# Patient Record
Sex: MALE | Race: WHITE | NOT HISPANIC OR LATINO | Employment: FULL TIME | URBAN - METROPOLITAN AREA
[De-identification: names, ages, dates, MRNs, and addresses within clinical notes are randomized per-mention and may not be internally consistent; named-entity substitution may affect disease eponyms.]

---

## 2017-01-25 DIAGNOSIS — Z00.00 ROUTINE GENERAL MEDICAL EXAMINATION AT A HEALTH CARE FACILITY: Primary | ICD-10-CM

## 2017-02-01 ENCOUNTER — CLINICAL SUPPORT (OUTPATIENT)
Dept: AUDIOLOGY | Facility: CLINIC | Age: 37
End: 2017-02-01
Payer: COMMERCIAL

## 2017-02-01 ENCOUNTER — PROCEDURE VISIT (OUTPATIENT)
Dept: PULMONOLOGY | Facility: CLINIC | Age: 37
End: 2017-02-01
Payer: COMMERCIAL

## 2017-02-01 ENCOUNTER — OFFICE VISIT (OUTPATIENT)
Dept: INTERNAL MEDICINE | Facility: CLINIC | Age: 37
End: 2017-02-01
Payer: COMMERCIAL

## 2017-02-01 ENCOUNTER — HOSPITAL ENCOUNTER (OUTPATIENT)
Dept: RADIOLOGY | Facility: HOSPITAL | Age: 37
Discharge: HOME | End: 2017-02-01
Attending: INTERNAL MEDICINE
Payer: COMMERCIAL

## 2017-02-01 ENCOUNTER — CLINICAL SUPPORT (OUTPATIENT)
Dept: INTERNAL MEDICINE | Facility: CLINIC | Age: 37
End: 2017-02-01

## 2017-02-01 ENCOUNTER — CLINICAL SUPPORT (OUTPATIENT)
Dept: CARDIOLOGY | Facility: CLINIC | Age: 37
End: 2017-02-01
Payer: COMMERCIAL

## 2017-02-01 VITALS
BODY MASS INDEX: 27.09 KG/M2 | TEMPERATURE: 97 F | SYSTOLIC BLOOD PRESSURE: 110 MMHG | DIASTOLIC BLOOD PRESSURE: 62 MMHG | RESPIRATION RATE: 16 BRPM | OXYGEN SATURATION: 98 % | HEART RATE: 56 BPM | HEIGHT: 72 IN | WEIGHT: 200 LBS

## 2017-02-01 VITALS
RESPIRATION RATE: 16 BRPM | DIASTOLIC BLOOD PRESSURE: 62 MMHG | WEIGHT: 200.63 LBS | BODY MASS INDEX: 27.17 KG/M2 | HEART RATE: 56 BPM | HEIGHT: 72 IN | SYSTOLIC BLOOD PRESSURE: 110 MMHG

## 2017-02-01 DIAGNOSIS — Z00.00 ROUTINE GENERAL MEDICAL EXAMINATION AT A HEALTH CARE FACILITY: Primary | ICD-10-CM

## 2017-02-01 DIAGNOSIS — H90.5 HEARING LOSS, SENSORINEURAL, COMBINED TYPES: Primary | ICD-10-CM

## 2017-02-01 DIAGNOSIS — Z85.820 HISTORY OF MELANOMA: ICD-10-CM

## 2017-02-01 DIAGNOSIS — Z00.00 ROUTINE GENERAL MEDICAL EXAMINATION AT A HEALTH CARE FACILITY: ICD-10-CM

## 2017-02-01 LAB
ALBUMIN SERPL BCP-MCNC: 4.2 G/DL
ALP SERPL-CCNC: 37 U/L
ALT SERPL W/O P-5'-P-CCNC: 26 U/L
ANION GAP SERPL CALC-SCNC: 10 MMOL/L
AST SERPL-CCNC: 19 U/L
BILIRUB SERPL-MCNC: 0.7 MG/DL
BUN SERPL-MCNC: 22 MG/DL
CALCIUM SERPL-MCNC: 9.8 MG/DL
CHLORIDE SERPL-SCNC: 108 MMOL/L
CHOLEST/HDLC SERPL: 3.8 {RATIO}
CO2 SERPL-SCNC: 23 MMOL/L
CREAT SERPL-MCNC: 0.9 MG/DL
ERYTHROCYTE [DISTWIDTH] IN BLOOD BY AUTOMATED COUNT: 12.6 %
EST. GFR  (AFRICAN AMERICAN): >60 ML/MIN/1.73 M^2
EST. GFR  (NON AFRICAN AMERICAN): >60 ML/MIN/1.73 M^2
GLUCOSE SERPL-MCNC: 92 MG/DL
HCT VFR BLD AUTO: 41.2 %
HDL/CHOLESTEROL RATIO: 26.2 %
HDLC SERPL-MCNC: 141 MG/DL
HDLC SERPL-MCNC: 37 MG/DL
HGB BLD-MCNC: 15.1 G/DL
LDLC SERPL CALC-MCNC: 87.6 MG/DL
MCH RBC QN AUTO: 31.9 PG
MCHC RBC AUTO-ENTMCNC: 36.7 %
MCV RBC AUTO: 87 FL
NONHDLC SERPL-MCNC: 104 MG/DL
PLATELET # BLD AUTO: 252 K/UL
PMV BLD AUTO: 9.5 FL
POTASSIUM SERPL-SCNC: 4.5 MMOL/L
PRE FEF 25 75: 3.53 L/S (ref 3.47–5.16)
PRE FET 100: 9.32 S
PRE FEV1 FVC: 78 %
PRE FEV1: 3.8 L (ref 4.14–4.97)
PRE FIF 50: 3.27 L/S
PRE FVC: 4.85 L (ref 5.21–6.2)
PRE PEF: 7.62 L/S (ref 9.46–11.92)
PREDICTED FEV1 FVC: 80.42 % (ref 75.58–85.26)
PREDICTED FEV1: 4.56 L (ref 4.14–4.97)
PREDICTED FVC: 5.71 L (ref 5.21–6.2)
PROT SERPL-MCNC: 7.5 G/DL
PROVOCATION PROTOCOL: ABNORMAL
RBC # BLD AUTO: 4.74 M/UL
SODIUM SERPL-SCNC: 141 MMOL/L
TRIGL SERPL-MCNC: 82 MG/DL
WBC # BLD AUTO: 5.89 K/UL

## 2017-02-01 PROCEDURE — 90471 IMMUNIZATION ADMIN: CPT | Mod: S$GLB,,, | Performed by: INTERNAL MEDICINE

## 2017-02-01 PROCEDURE — 90688 IIV4 VACCINE SPLT 0.5 ML IM: CPT | Mod: S$GLB,,, | Performed by: INTERNAL MEDICINE

## 2017-02-01 PROCEDURE — 80061 LIPID PANEL: CPT | Mod: PO

## 2017-02-01 PROCEDURE — 86703 HIV-1/HIV-2 1 RESULT ANTBDY: CPT

## 2017-02-01 PROCEDURE — 80053 COMPREHEN METABOLIC PANEL: CPT | Mod: PO

## 2017-02-01 PROCEDURE — 83655 ASSAY OF LEAD: CPT

## 2017-02-01 PROCEDURE — 99385 PREV VISIT NEW AGE 18-39: CPT | Mod: 25,S$GLB,, | Performed by: INTERNAL MEDICINE

## 2017-02-01 PROCEDURE — 83036 HEMOGLOBIN GLYCOSYLATED A1C: CPT

## 2017-02-01 PROCEDURE — 92552 PURE TONE AUDIOMETRY AIR: CPT | Mod: S$GLB,,, | Performed by: AUDIOLOGIST

## 2017-02-01 PROCEDURE — 85027 COMPLETE CBC AUTOMATED: CPT | Mod: PO

## 2017-02-01 PROCEDURE — 71020 XR CHEST PA AND LATERAL: CPT | Mod: TC,PO

## 2017-02-01 PROCEDURE — 71020 XR CHEST PA AND LATERAL: CPT | Mod: 26,,, | Performed by: RADIOLOGY

## 2017-02-01 PROCEDURE — 94010 BREATHING CAPACITY TEST: CPT | Mod: S$GLB,,, | Performed by: INTERNAL MEDICINE

## 2017-02-01 PROCEDURE — 99999 PR PBB SHADOW E&M-EST. PATIENT-LVL III: CPT | Mod: PBBFAC,,, | Performed by: INTERNAL MEDICINE

## 2017-02-01 PROCEDURE — 93000 ELECTROCARDIOGRAM COMPLETE: CPT | Mod: S$GLB,,, | Performed by: INTERNAL MEDICINE

## 2017-02-01 NOTE — PROGRESS NOTES
Subjective:      Patient ID: Rip Lopez is a 36 y.o. male.    Chief Complaint: Executive Health    HPI Comments: 35 yo with Past Medical History:    History of melanoma                             1991           for [facility] dept.  with 3 healthy children.    No meds  nkda    Past Surgical History:    SKIN CANCER EXCISION                             1991          APPENDECTOMY                                     2000    family history includes Diabetes in his father; Heart disease in his father s/p bypass in his early 50s. ; Hypertension in his father; epilepsy in his mother but o/w. There is no history of Cancer.    Prevent: reported flu vaccine up to date. tdap given on day of exam.                         Review of Systems   Constitutional: Negative for chills and fever.   HENT: Negative for ear pain and sore throat.    Respiratory: Negative for cough.    Cardiovascular: Negative for chest pain.   Gastrointestinal: Negative for abdominal pain and blood in stool.   Genitourinary: Negative for dysuria and hematuria.   Neurological: Negative for seizures and syncope.     Objective:     Visit Vitals    /62    Pulse (!) 56    Temp 96.7 °F (35.9 °C) (Tympanic)    Resp 16    Ht 6' (1.829 m)    Wt 90.7 kg (200 lb)    SpO2 98%    BMI 27.12 kg/m2       Physical Exam   Constitutional: He is oriented to person, place, and time. He appears well-developed and well-nourished. No distress.   HENT:   Head: Normocephalic and atraumatic.   Mouth/Throat: Oropharynx is clear and moist.   Eyes: EOM are normal. Pupils are equal, round, and reactive to light.   Neck: Neck supple. No thyromegaly present.   Cardiovascular: Normal rate and regular rhythm.    Pulmonary/Chest: Breath sounds normal. He has no wheezes. He has no rales.   Abdominal: Soft. Bowel sounds are normal. There is no tenderness.   Musculoskeletal: He exhibits no edema.   Lymphadenopathy:     He has no cervical adenopathy.    Neurological: He is alert and oriented to person, place, and time.   Skin: Skin is warm and dry.   Psychiatric: He has a normal mood and affect. His behavior is normal.       Assessment:     1. Routine general medical examination at a health care facility    2. History of melanoma      Plan:   Routine general medical examination at a health care facility  -     Influenza - Quadrivalent (3 years & older)    History of melanoma  -     Ambulatory referral to Dermatology        See [facility] health letter for details.      Return if symptoms worsen or fail to improve.

## 2017-02-01 NOTE — PROGRESS NOTES
Executive Health Screening    Rip Lopez was seen 02/01/2017 for an executive health hearing screen.      Results reveal a mild-to-moderately severe hearing loss 9309-4821 Hz for the right ear, and  mild-to-moderately severe hearing loss 8313-0062 Hz for the left ear.     Otoscopy was unremarkable.    Patient was counseled on the above findings.    Recommendations include:    1.  Complete Audiological Evaluation  2.  ENT followup  3.  Possible Hearing aid consult pending candidacy   4.  Wear hearing protective devices around loud noise  5.  Annual audiograms

## 2017-02-02 LAB
CITY: NORMAL
COUNTY: NORMAL
ESTIMATED AVG GLUCOSE: 85 MG/DL
GUARDIAN FIRST NAME: NORMAL
GUARDIAN LAST NAME: NORMAL
HBA1C MFR BLD HPLC: 4.6 %
HIV 1+2 AB+HIV1 P24 AG SERPL QL IA: NEGATIVE
LEAD BLD-MCNC: <1 MCG/DL (ref 0–4.9)
PHONE #: NORMAL
POSTAL CODE: NORMAL
RACE: NORMAL
SPECIMEN SOURCE: NORMAL
STATE OF RESIDENCE: NORMAL
STREET ADDRESS: NORMAL

## 2018-11-08 DIAGNOSIS — Z00.00 ROUTINE GENERAL MEDICAL EXAMINATION AT A HEALTH CARE FACILITY: Primary | ICD-10-CM

## 2018-11-27 ENCOUNTER — CLINICAL SUPPORT (OUTPATIENT)
Dept: PULMONOLOGY | Facility: CLINIC | Age: 38
End: 2018-11-27

## 2018-11-27 ENCOUNTER — CLINICAL SUPPORT (OUTPATIENT)
Dept: AUDIOLOGY | Facility: CLINIC | Age: 38
End: 2018-11-27

## 2018-11-27 ENCOUNTER — OFFICE VISIT (OUTPATIENT)
Dept: INTERNAL MEDICINE | Facility: CLINIC | Age: 38
End: 2018-11-27
Payer: COMMERCIAL

## 2018-11-27 ENCOUNTER — CLINICAL SUPPORT (OUTPATIENT)
Dept: INTERNAL MEDICINE | Facility: CLINIC | Age: 38
End: 2018-11-27
Payer: COMMERCIAL

## 2018-11-27 VITALS
HEART RATE: 58 BPM | HEIGHT: 72 IN | SYSTOLIC BLOOD PRESSURE: 128 MMHG | TEMPERATURE: 98 F | BODY MASS INDEX: 27.77 KG/M2 | DIASTOLIC BLOOD PRESSURE: 78 MMHG | WEIGHT: 205 LBS | OXYGEN SATURATION: 98 % | RESPIRATION RATE: 14 BRPM

## 2018-11-27 VITALS
WEIGHT: 205 LBS | HEART RATE: 58 BPM | RESPIRATION RATE: 14 BRPM | SYSTOLIC BLOOD PRESSURE: 128 MMHG | HEIGHT: 72 IN | BODY MASS INDEX: 27.77 KG/M2 | DIASTOLIC BLOOD PRESSURE: 78 MMHG

## 2018-11-27 DIAGNOSIS — Z85.820 HISTORY OF MELANOMA: ICD-10-CM

## 2018-11-27 DIAGNOSIS — R74.8 ELEVATED LIVER ENZYMES: ICD-10-CM

## 2018-11-27 DIAGNOSIS — Z23 NEED FOR INFLUENZA VACCINATION: ICD-10-CM

## 2018-11-27 DIAGNOSIS — Z01.12 ENCOUNTER FOR HEARING CONSERVATION AND TREATMENT: Primary | ICD-10-CM

## 2018-11-27 DIAGNOSIS — Z00.00 ROUTINE GENERAL MEDICAL EXAMINATION AT A HEALTH CARE FACILITY: Primary | ICD-10-CM

## 2018-11-27 DIAGNOSIS — Z00.00 ROUTINE GENERAL MEDICAL EXAMINATION AT A HEALTH CARE FACILITY: ICD-10-CM

## 2018-11-27 DIAGNOSIS — H91.90 HEARING DIFFICULTY, UNSPECIFIED LATERALITY: ICD-10-CM

## 2018-11-27 LAB
ALBUMIN SERPL BCP-MCNC: 4.2 G/DL
ALP SERPL-CCNC: 38 U/L
ALT SERPL W/O P-5'-P-CCNC: 46 U/L
ANION GAP SERPL CALC-SCNC: 7 MMOL/L
AST SERPL-CCNC: 57 U/L
BILIRUB SERPL-MCNC: 0.9 MG/DL
BILIRUB UR QL STRIP: NEGATIVE
BRPFT: NORMAL
BUN SERPL-MCNC: 21 MG/DL
CALCIUM SERPL-MCNC: 9.7 MG/DL
CHLORIDE SERPL-SCNC: 107 MMOL/L
CHOLEST SERPL-MCNC: 151 MG/DL
CHOLEST/HDLC SERPL: 3.7 {RATIO}
CLARITY UR: CLEAR
CO2 SERPL-SCNC: 27 MMOL/L
COLOR UR: YELLOW
CREAT SERPL-MCNC: 0.9 MG/DL
ERYTHROCYTE [DISTWIDTH] IN BLOOD BY AUTOMATED COUNT: 12.8 %
EST. GFR  (AFRICAN AMERICAN): >60 ML/MIN/1.73 M^2
EST. GFR  (NON AFRICAN AMERICAN): >60 ML/MIN/1.73 M^2
ESTIMATED AVG GLUCOSE: 85 MG/DL
FEF 25 75 LLN: 2.56
FEF 25 75 PRE REF: 79 %
FEF 25 75 REF: 4.36
FEV1 FVC LLN: 70
FEV1 FVC PRE REF: 98.4 %
FEV1 FVC REF: 81
FEV1 LLN: 3.59
FEV1 PRE REF: 83.6 %
FEV1 REF: 4.53
FEV6 LLN: 4.57
FEV6 PRE REF: 84.7 %
FEV6 PRE: 4.69 L (ref 4.57–6.49)
FEV6 REF: 5.53
FVC LLN: 4.5
FVC PRE REF: 84.6 %
FVC REF: 5.65
GLUCOSE SERPL-MCNC: 92 MG/DL
GLUCOSE UR QL STRIP: NEGATIVE
HBA1C MFR BLD HPLC: 4.6 %
HCT VFR BLD AUTO: 41.2 %
HDLC SERPL-MCNC: 41 MG/DL
HDLC SERPL: 27.2 %
HGB BLD-MCNC: 14.9 G/DL
HGB UR QL STRIP: NEGATIVE
KETONES UR QL STRIP: NEGATIVE
LDLC SERPL CALC-MCNC: 94.6 MG/DL
LEUKOCYTE ESTERASE UR QL STRIP: NEGATIVE
MCH RBC QN AUTO: 31.6 PG
MCHC RBC AUTO-ENTMCNC: 36.2 G/DL
MCV RBC AUTO: 88 FL
MVV LLN: 144
MVV REF: 170
NITRITE UR QL STRIP: NEGATIVE
NONHDLC SERPL-MCNC: 110 MG/DL
PEF LLN: 8.21
PEF PRE REF: 95.4 %
PEF REF: 10.66
PH UR STRIP: 6 [PH] (ref 5–8)
PLATELET # BLD AUTO: 235 K/UL
PMV BLD AUTO: 9.4 FL
POTASSIUM SERPL-SCNC: 4.3 MMOL/L
PRE FEF 25 75: 3.45 L/S (ref 2.56–6.16)
PRE FET 100: 9.45 SEC
PRE FEV1 FVC: 79.24 % (ref 70.15–90.99)
PRE FEV1: 3.79 L (ref 3.59–5.47)
PRE FVC: 4.78 L (ref 4.5–6.8)
PRE PEF: 10.17 L/S (ref 8.21–13.11)
PROT SERPL-MCNC: 7.5 G/DL
PROT UR QL STRIP: NEGATIVE
RBC # BLD AUTO: 4.71 M/UL
SODIUM SERPL-SCNC: 141 MMOL/L
SP GR UR STRIP: 1.02 (ref 1–1.03)
TRIGL SERPL-MCNC: 77 MG/DL
URN SPEC COLLECT METH UR: NORMAL
WBC # BLD AUTO: 5.02 K/UL

## 2018-11-27 PROCEDURE — 90472 IMMUNIZATION ADMIN EACH ADD: CPT | Mod: S$GLB,,, | Performed by: INTERNAL MEDICINE

## 2018-11-27 PROCEDURE — 99395 PREV VISIT EST AGE 18-39: CPT | Mod: 25,S$GLB,, | Performed by: INTERNAL MEDICINE

## 2018-11-27 PROCEDURE — 81003 URINALYSIS AUTO W/O SCOPE: CPT | Mod: PO

## 2018-11-27 PROCEDURE — 90686 IIV4 VACC NO PRSV 0.5 ML IM: CPT | Mod: S$GLB,,, | Performed by: INTERNAL MEDICINE

## 2018-11-27 PROCEDURE — 94010 BREATHING CAPACITY TEST: CPT | Mod: S$GLB,,, | Performed by: INTERNAL MEDICINE

## 2018-11-27 PROCEDURE — 90715 TDAP VACCINE 7 YRS/> IM: CPT | Mod: S$GLB,,, | Performed by: INTERNAL MEDICINE

## 2018-11-27 PROCEDURE — 80053 COMPREHEN METABOLIC PANEL: CPT | Mod: PO

## 2018-11-27 PROCEDURE — 83036 HEMOGLOBIN GLYCOSYLATED A1C: CPT

## 2018-11-27 PROCEDURE — 92552 PURE TONE AUDIOMETRY AIR: CPT | Mod: S$GLB,,, | Performed by: AUDIOLOGIST-HEARING AID FITTER

## 2018-11-27 PROCEDURE — 90471 IMMUNIZATION ADMIN: CPT | Mod: S$GLB,,, | Performed by: INTERNAL MEDICINE

## 2018-11-27 PROCEDURE — 83655 ASSAY OF LEAD: CPT

## 2018-11-27 PROCEDURE — 80061 LIPID PANEL: CPT | Mod: PO

## 2018-11-27 PROCEDURE — 99999 PR PBB SHADOW E&M-EST. PATIENT-LVL IV: CPT | Mod: PBBFAC,,, | Performed by: INTERNAL MEDICINE

## 2018-11-27 PROCEDURE — 85027 COMPLETE CBC AUTOMATED: CPT | Mod: PO

## 2018-11-27 NOTE — PROGRESS NOTES
Subjective:      Patient ID: Rip Lopez is a 38 y.o. male.    Chief Complaint: Executive Health    HPI   It was a pleasure to meet you for your executive  physical on 11/27/18.  You are a 38-year-old  gentleman with a past medical history of melanoma  diagnosed in 1991.  You are a  for [employer].  You are  with three  healthy children.  You currently take no medications  and you have no known drug allergies. Non smoker. Occ etoh.      Pcp: [facility].   Your past surgical history includes skin cancer  excision in 1991 and an appendectomy in year 2000.     Your family history includes diabetes and heart disease  in your father.  Your father had bypass surgery in his  early 50s.  Your father also has a history of  hypertension.  Your mother has a history of epilepsy,  but is otherwise healthy. Siblings healthy.     PREVENTIVE MEDICINE: flu vaccine today.  Your tetanus, diphtheria, and  whooping cough vaccine was given on day of your exec health exam.     Review of Systems   Constitutional: Negative for chills and fever.   HENT: Negative for ear pain and sore throat.    Respiratory: Negative for cough.    Cardiovascular: Negative for chest pain.   Gastrointestinal: Negative for abdominal pain and blood in stool.   Genitourinary: Negative for dysuria and hematuria.   Neurological: Negative for seizures and syncope.     Objective:   /78 (BP Location: Right arm, Patient Position: Sitting)   Pulse (!) 58   Temp 98.1 °F (36.7 °C) (Tympanic)   Resp 14   Ht 6' (1.829 m)   Wt 93 kg (205 lb 0.4 oz)   SpO2 98%   BMI 27.81 kg/m²     Physical Exam   Constitutional: He is oriented to person, place, and time. He appears well-developed and well-nourished. No distress.   HENT:   Head: Normocephalic and atraumatic.   Mouth/Throat: Oropharynx is clear and moist.   Eyes: EOM are normal. Pupils are equal, round, and reactive to light.   Neck: Neck supple. Carotid bruit is not present. No  thyromegaly present.   Cardiovascular: Normal rate and regular rhythm.   Pulmonary/Chest: Breath sounds normal. He has no wheezes. He has no rales.   Abdominal: Soft. Bowel sounds are normal. There is no tenderness.   Musculoskeletal: He exhibits no edema.   Lymphadenopathy:     He has no cervical adenopathy.   Neurological: He is alert and oriented to person, place, and time.   Skin: Skin is warm and dry.   Psychiatric: He has a normal mood and affect. His behavior is normal.       Assessment:     1. Routine general medical examination at a health care facility    2. History of melanoma    3. Hearing difficulty, unspecified laterality    4. Need for influenza vaccination    5. Elevated liver enzymes      Plan:   Routine general medical examination at a health care facility    History of melanoma  -     Ambulatory referral to Dermatology    Hearing difficulty, unspecified laterality  -     Ambulatory referral to ENT    Need for influenza vaccination  -     Influenza - Quadrivalent (3 years & older) (PF)  -     (In Office Administered) Tdap Vaccine    Elevated liver enzymes      Heart healthy diet and reg exercise  HM reviewed  Discuss elevated liver enzymes with pcp      Problem List Items Addressed This Visit        Oncology    History of melanoma    Relevant Orders    Ambulatory referral to Dermatology      Other Visit Diagnoses     Routine general medical examination at a health care facility    -  Primary    Hearing difficulty, unspecified laterality        Relevant Orders    Ambulatory referral to ENT    Need for influenza vaccination        Relevant Orders    Influenza - Quadrivalent (3 years & older) (PF) (Completed)    (In Office Administered) Tdap Vaccine (Completed)    Elevated liver enzymes              Follow-up if symptoms worsen or fail to improve.

## 2018-11-27 NOTE — PROGRESS NOTES
Executive Health Screening    Rip Lopez was seen 11/27/2018 for an executive health hearing screen.      Results reveal a mild-to-moderate hearing loss 250-8000 Hz for the right ear, and  Mild to moderately severe hearing loss 250-8000 Hz for the left ear.     Otoscopy was unremarkable.    Patient was counseled on the above findings.    1.  Complete Audiological Evaluation  2.  ENT followup  3.  Hearing aid consult pending candidacy   4.  Wear hearing protective devices around loud noise  5.  Annual audiograms

## 2018-11-28 LAB
CITY: NORMAL
COUNTY: NORMAL
GUARDIAN FIRST NAME: NORMAL
GUARDIAN LAST NAME: NORMAL
LEAD, BLOOD: <1 MCG/DL (ref 0–4.9)
PHONE #: NORMAL
POSTAL CODE: NORMAL
RACE: NORMAL
SPECIMEN SOURCE: NORMAL
STATE OF RESIDENCE: NORMAL
STREET ADDRESS: NORMAL

## 2019-10-07 DIAGNOSIS — Z00.00 ROUTINE GENERAL MEDICAL EXAMINATION AT A HEALTH CARE FACILITY: Primary | ICD-10-CM

## 2019-10-08 DIAGNOSIS — Z00.00 ROUTINE GENERAL MEDICAL EXAMINATION AT A HEALTH CARE FACILITY: Primary | ICD-10-CM

## 2019-11-13 ENCOUNTER — CLINICAL SUPPORT (OUTPATIENT)
Dept: AUDIOLOGY | Facility: CLINIC | Age: 39
End: 2019-11-13

## 2019-11-13 ENCOUNTER — CLINICAL SUPPORT (OUTPATIENT)
Dept: INTERNAL MEDICINE | Facility: CLINIC | Age: 39
End: 2019-11-13
Payer: COMMERCIAL

## 2019-11-13 ENCOUNTER — CLINICAL SUPPORT (OUTPATIENT)
Dept: CARDIOLOGY | Facility: CLINIC | Age: 39
End: 2019-11-13
Attending: INTERNAL MEDICINE

## 2019-11-13 ENCOUNTER — CLINICAL SUPPORT (OUTPATIENT)
Dept: PULMONOLOGY | Facility: CLINIC | Age: 39
End: 2019-11-13

## 2019-11-13 ENCOUNTER — CLINICAL SUPPORT (OUTPATIENT)
Dept: CARDIOLOGY | Facility: CLINIC | Age: 39
End: 2019-11-13

## 2019-11-13 ENCOUNTER — OFFICE VISIT (OUTPATIENT)
Dept: INTERNAL MEDICINE | Facility: CLINIC | Age: 39
End: 2019-11-13
Payer: COMMERCIAL

## 2019-11-13 VITALS
RESPIRATION RATE: 14 BRPM | WEIGHT: 205 LBS | BODY MASS INDEX: 27.81 KG/M2 | SYSTOLIC BLOOD PRESSURE: 125 MMHG | DIASTOLIC BLOOD PRESSURE: 66 MMHG | HEART RATE: 60 BPM | TEMPERATURE: 97 F

## 2019-11-13 DIAGNOSIS — Z01.12 HEARING CONSERVATION AND TREATMENT EXAM: Primary | ICD-10-CM

## 2019-11-13 DIAGNOSIS — H91.90 HEARING LOSS, UNSPECIFIED HEARING LOSS TYPE, UNSPECIFIED LATERALITY: ICD-10-CM

## 2019-11-13 DIAGNOSIS — Z00.00 ROUTINE GENERAL MEDICAL EXAMINATION AT A HEALTH CARE FACILITY: Primary | ICD-10-CM

## 2019-11-13 DIAGNOSIS — Z71.3 DIETARY COUNSELING: ICD-10-CM

## 2019-11-13 DIAGNOSIS — Z00.00 ROUTINE GENERAL MEDICAL EXAMINATION AT A HEALTH CARE FACILITY: ICD-10-CM

## 2019-11-13 LAB
ALBUMIN SERPL BCP-MCNC: 4.2 G/DL (ref 3.5–5.2)
ALP SERPL-CCNC: 35 U/L (ref 55–135)
ALT SERPL W/O P-5'-P-CCNC: 26 U/L (ref 10–44)
ANION GAP SERPL CALC-SCNC: 7 MMOL/L (ref 8–16)
AST SERPL-CCNC: 18 U/L (ref 10–40)
BILIRUB SERPL-MCNC: 0.7 MG/DL (ref 0.1–1)
BILIRUB UR QL STRIP: NEGATIVE
BRPFT: ABNORMAL
BUN SERPL-MCNC: 18 MG/DL (ref 6–20)
CALCIUM SERPL-MCNC: 9.6 MG/DL (ref 8.7–10.5)
CHLORIDE SERPL-SCNC: 106 MMOL/L (ref 95–110)
CHOLEST SERPL-MCNC: 153 MG/DL (ref 120–199)
CHOLEST/HDLC SERPL: 4.1 {RATIO} (ref 2–5)
CLARITY UR: CLEAR
CO2 SERPL-SCNC: 26 MMOL/L (ref 23–29)
COLOR UR: YELLOW
CREAT SERPL-MCNC: 1 MG/DL (ref 0.5–1.4)
DIASTOLIC DYSFUNCTION: NO
ERYTHROCYTE [DISTWIDTH] IN BLOOD BY AUTOMATED COUNT: 12 % (ref 11.5–14.5)
EST. GFR  (AFRICAN AMERICAN): >60 ML/MIN/1.73 M^2
EST. GFR  (NON AFRICAN AMERICAN): >60 ML/MIN/1.73 M^2
ESTIMATED AVG GLUCOSE: 91 MG/DL (ref 68–131)
FEF 25 75 LLN: 2.52
FEF 25 75 PRE REF: 70.8 %
FEF 25 75 REF: 4.31
FEV1 FVC LLN: 70
FEV1 FVC PRE REF: 97 %
FEV1 FVC REF: 80
FEV1 LLN: 3.57
FEV1 PRE REF: 78.8 %
FEV1 REF: 4.5
FVC LLN: 4.48
FVC PRE REF: 80.8 %
FVC REF: 5.63
GLUCOSE SERPL-MCNC: 114 MG/DL (ref 70–110)
GLUCOSE UR QL STRIP: NEGATIVE
HBA1C MFR BLD HPLC: 4.8 % (ref 4–5.6)
HCT VFR BLD AUTO: 41.7 % (ref 40–54)
HDLC SERPL-MCNC: 37 MG/DL (ref 40–75)
HDLC SERPL: 24.2 % (ref 20–50)
HGB BLD-MCNC: 15.2 G/DL (ref 14–18)
HGB UR QL STRIP: NEGATIVE
KETONES UR QL STRIP: NEGATIVE
LDLC SERPL CALC-MCNC: 99.6 MG/DL (ref 63–159)
LEUKOCYTE ESTERASE UR QL STRIP: NEGATIVE
MCH RBC QN AUTO: 31.5 PG (ref 27–31)
MCHC RBC AUTO-ENTMCNC: 36.5 G/DL (ref 32–36)
MCV RBC AUTO: 87 FL (ref 82–98)
NITRITE UR QL STRIP: NEGATIVE
NONHDLC SERPL-MCNC: 116 MG/DL
PEF LLN: 8.19
PEF PRE REF: 105.3 %
PEF REF: 10.64
PH UR STRIP: 6 [PH] (ref 5–8)
PLATELET # BLD AUTO: 256 K/UL (ref 150–350)
PMV BLD AUTO: 9.2 FL (ref 9.2–12.9)
POTASSIUM SERPL-SCNC: 4.3 MMOL/L (ref 3.5–5.1)
PRE FEF 25 75: 3.05 L/S (ref 2.52–6.11)
PRE FET 100: 8.98 SEC
PRE FEV1 FVC: 77.98 % (ref 69.97–90.79)
PRE FEV1: 3.55 L (ref 3.57–5.44)
PRE FVC: 4.55 L (ref 4.48–6.78)
PRE PEF: 11.2 L/S (ref 8.19–13.09)
PROT SERPL-MCNC: 7.5 G/DL (ref 6–8.4)
PROT UR QL STRIP: NEGATIVE
RBC # BLD AUTO: 4.82 M/UL (ref 4.6–6.2)
SODIUM SERPL-SCNC: 139 MMOL/L (ref 136–145)
SP GR UR STRIP: 1.01 (ref 1–1.03)
TRIGL SERPL-MCNC: 82 MG/DL (ref 30–150)
TSH SERPL DL<=0.005 MIU/L-ACNC: 1.15 UIU/ML (ref 0.4–4)
URN SPEC COLLECT METH UR: NORMAL
WBC # BLD AUTO: 5.31 K/UL (ref 3.9–12.7)

## 2019-11-13 PROCEDURE — 83655 ASSAY OF LEAD: CPT

## 2019-11-13 PROCEDURE — 81003 URINALYSIS AUTO W/O SCOPE: CPT

## 2019-11-13 PROCEDURE — 93005 ELECTROCARDIOGRAM TRACING: CPT | Mod: S$GLB,,, | Performed by: INTERNAL MEDICINE

## 2019-11-13 PROCEDURE — 85027 COMPLETE CBC AUTOMATED: CPT

## 2019-11-13 PROCEDURE — 99999 PR PBB SHADOW E&M-EST. PATIENT-LVL III: CPT | Mod: PBBFAC,,, | Performed by: INTERNAL MEDICINE

## 2019-11-13 PROCEDURE — 94010 BREATHING CAPACITY TEST: CPT | Mod: S$GLB,,, | Performed by: INTERNAL MEDICINE

## 2019-11-13 PROCEDURE — 83036 HEMOGLOBIN GLYCOSYLATED A1C: CPT

## 2019-11-13 PROCEDURE — 84443 ASSAY THYROID STIM HORMONE: CPT

## 2019-11-13 PROCEDURE — 94010 BREATHING CAPACITY TEST: ICD-10-PCS | Mod: S$GLB,,, | Performed by: INTERNAL MEDICINE

## 2019-11-13 PROCEDURE — 93010 EKG 12-LEAD: ICD-10-PCS | Mod: S$GLB,,, | Performed by: INTERNAL MEDICINE

## 2019-11-13 PROCEDURE — 97802 PR MED NUTR THER, 1ST, INDIV, EA 15 MIN: ICD-10-PCS | Mod: S$GLB,,, | Performed by: INTERNAL MEDICINE

## 2019-11-13 PROCEDURE — 80053 COMPREHEN METABOLIC PANEL: CPT

## 2019-11-13 PROCEDURE — 93010 ELECTROCARDIOGRAM REPORT: CPT | Mod: S$GLB,,, | Performed by: INTERNAL MEDICINE

## 2019-11-13 PROCEDURE — 99999 PR PBB SHADOW E&M-EST. PATIENT-LVL III: ICD-10-PCS | Mod: PBBFAC,,, | Performed by: INTERNAL MEDICINE

## 2019-11-13 PROCEDURE — 93015 CARDIAC TREADMILL STRESS TEST: ICD-10-PCS | Mod: S$GLB,,, | Performed by: INTERNAL MEDICINE

## 2019-11-13 PROCEDURE — 97802 MEDICAL NUTRITION INDIV IN: CPT | Mod: S$GLB,,, | Performed by: INTERNAL MEDICINE

## 2019-11-13 PROCEDURE — 93015 CV STRESS TEST SUPVJ I&R: CPT | Mod: S$GLB,,, | Performed by: INTERNAL MEDICINE

## 2019-11-13 PROCEDURE — 92552 PURE TONE AUDIOMETRY AIR: CPT | Mod: S$GLB,,, | Performed by: AUDIOLOGIST-HEARING AID FITTER

## 2019-11-13 PROCEDURE — 80061 LIPID PANEL: CPT

## 2019-11-13 PROCEDURE — 99396 PREV VISIT EST AGE 40-64: CPT | Mod: S$GLB,,, | Performed by: INTERNAL MEDICINE

## 2019-11-13 PROCEDURE — 93005 EKG 12-LEAD: ICD-10-PCS | Mod: S$GLB,,, | Performed by: INTERNAL MEDICINE

## 2019-11-13 PROCEDURE — 99396 PR PREVENTIVE VISIT,EST,40-64: ICD-10-PCS | Mod: S$GLB,,, | Performed by: INTERNAL MEDICINE

## 2019-11-13 PROCEDURE — 92552 PR PURE TONE AUDIOMETRY, AIR: ICD-10-PCS | Mod: S$GLB,,, | Performed by: AUDIOLOGIST-HEARING AID FITTER

## 2019-11-13 NOTE — PROGRESS NOTES
Nutrition Assessment  Client name:  Rip Lopez  :  1980  Age:  39 y.o.  Gender:  male    Client states:  Very pleasant employee from [employer] (employed 5 years) here for his annual physical with [employer]. Reports being  and has 3 children. Does not smoke and drinks alcohol occasionally. Does not have any set exercise routine he performs. 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 Patient reports knowing his portion control needs to be improve. Tries to limit sugar intake to a very minimal amount. Consumes a variety of protein options, such as chicken, beef, pork, and turkey. Patient reports including a side salad with all of his dinner meals at home. Majority of meals are home-cooked. Does not like the taste of fast food much so it is consumed as little as possible.      Anthropometrics  Height:  6'     Weight:  205 lbs  BMI:  27.81  % Body Fat:  n/a    Clinical Signs/Symptoms  N/V/D:  none  Appetite (Good, Fair, or Poor):  good      Past Medical History:   Diagnosis Date    History of melanoma        Past Surgical History:   Procedure Laterality Date    APPENDECTOMY  2000    SKIN CANCER EXCISION         Medications    currently has no medications in their medication list.    Vitamins, Minerals, and/or Supplements:  multivitamin     Food/Medication Interactions:  Reviewed     Food Allergies or Intolerances:  NKFA     Social History    Marital status:    Employment:  [employer]    Social History     Tobacco Use    Smoking status: Never Smoker    Smokeless tobacco: Never Used   Substance Use Topics    Alcohol use: No        Lab Reports   Total Cholesterol:  153    Triglycerides:  82  HDL:  37  LDL:  99.6   Glucose:  114  HbA1c:  pending  BP:  125/66     Food History  Breakfast:  Eggs + ash/ skip  Mid-morning Snack:  none  Lunch:  Canned tuna + pringles  Mid-afternoon Snack:  Canned tuna  Dinner:  Meat + vegetable + salad/  fried shrimp/ fried fish  H.S. Snack:  none  *Fluid intake:  Water, sweetened iced tea (dilutes second cup with water), 2 cups coffee with coffee mate    Exercise History:  None, inconsistent with body weight and HIIT workouts at [employer]    Cultural/Spiritual/Personal Preferences:  None noted    Support System:  Spouse    State of Change:  precontemplation    Barriers to Change:  none    Diagnosis    Overweight related to excessive energy intake and physical inactivity as evidenced by BMI 27.    Intervention    RMR (Method:  [place] Jeor):  1882 kcal  Activity Factor:  1.3  BRIELLE:  2446 - 250 = 2196 calories    Goals:  1.  At meals consume 1/2 plate vegetables, 1/4 plate protein, 1/4 plate starch.  2.  Begin exercising, aiming for 150 minutes walking weekly or less if HIIT is performed.  3.  Aim for 5% (10 lbs) weight loss over the next 3 months.    Nutrition Education  Labs were available at time of consult and were previously reviewed by physician with patient. In regards to below optimal HDL, discussed: exercise (150 minute minimum weekly), weight loss (5% within 3 months), well-balanced diet (My Plate Method), and choosing lean meats more often than higher fat (reviewed Meal Planning Guide). Encouraged increased fiber content (vegetables) to satiety at meals.     Patient verbalized understanding of nutrition education and recommendations received.    Handouts Provided  Meal Planning Guide  Restaurant Guide  Eat Fit Shopping List  Eat Fit Danette  Fast Food Guide  Satisfying Salads  My Plate Method  Heart-Healthy Nutrition Therapy    Monitoring/Evaluation    Monitor the following:  Weight  BMI  Caloric intake  Labs:  Lipid Panel, HgbA1c, CMP    Follow Up Plan:  Communication with referring healthcare provider is unnecessary at this time as patient presented as part of annual wellness exam.  However, will follow up with patient in 1-2 years.

## 2019-11-13 NOTE — PROGRESS NOTES
Executive Health Screening    Rip Lopez was seen 11/13/2019 for an executive health hearing screen.      Results reveal a normal to severe hearing loss 250-8000 Hz for the right ear, and  Normal to severe hearing loss 250-8000 Hz for the left ear.     Otoscopy was unremarkable.    Patient was counseled on the above findings.    1.  Complete Audiological Evaluation  2.  ENT followup  3.  Hearing aid consult pending candidacy   4.  Wear hearing protective devices around loud noise  5.  Annual audiograms

## 2019-11-13 NOTE — PROGRESS NOTES
Subjective:      Patient ID: Rip Lopez is a 39 y.o. male.    Chief Complaint: Executive Health    HPI     It was a pleasure to see you again for your Executive  Health Physical on November 13, 2019.  You are a  39-year-old gentleman with past medical history of  melanoma diagnosed in 1991.  You are a  with  [employer].  You are  with three  healthy children.  You currently take no medications  and you have no known drug allergies.     You are a nonsmoker and you occasionally drink alcohol.        Your past surgical history includes skin cancer  excision in 1991 and an appendectomy in 2000.     Your family history includes diabetes and heart disease  in your father.  Your father had bypass surgery in his  early 50s.  Your father also has a history of  hypertension.  Your mother has a history of epilepsy,  but is otherwise healthy.  Your siblings are healthy.     PREVENTATIVE MEDICINE:  Your flu is up to date. tetanus, diphtheria,  and whooping cough vaccine up dated 2018.   Review of Systems   Constitutional: Negative for chills and fever.   HENT: Negative for ear pain and sore throat.    Respiratory: Negative for cough, shortness of breath and wheezing.    Cardiovascular: Negative for chest pain and palpitations.   Gastrointestinal: Negative for abdominal pain and blood in stool.   Genitourinary: Negative for dysuria and hematuria.   Neurological: Negative for seizures and syncope.     Objective:   /66   Pulse 60   Temp 96.7 °F (35.9 °C) (Tympanic)   Resp 14   Wt 93 kg (205 lb 0.4 oz)   BMI 27.81 kg/m²     Physical Exam   Constitutional: He is oriented to person, place, and time. He appears well-developed and well-nourished. No distress.   HENT:   Head: Normocephalic and atraumatic.   Mouth/Throat: Oropharynx is clear and moist.   Eyes: Pupils are equal, round, and reactive to light. EOM are normal.   Neck: Neck supple. Carotid bruit is not present. No thyromegaly  present.   Cardiovascular: Normal rate and regular rhythm.   Pulmonary/Chest: Breath sounds normal. He has no wheezes. He has no rales.   Abdominal: Soft. Bowel sounds are normal. There is no tenderness.   Musculoskeletal: He exhibits no edema.   Lymphadenopathy:     He has no cervical adenopathy.   Neurological: He is alert and oriented to person, place, and time.   Skin: Skin is warm and dry.   Psychiatric: He has a normal mood and affect. His behavior is normal.       Assessment:     1. Routine general medical examination at a health care facility    2. Hearing loss, unspecified hearing loss type, unspecified laterality      Plan:   Routine general medical examination at a health care facility    Hearing loss, unspecified hearing loss type, unspecified laterality  -     Ambulatory Referral to ENT    Heart healthy diet and reg exercise  HM reviewed  See exec health letter for details.     Lab Frequency Next Occurrence   Spirometry without Bronchodilator Once 02/03/2020       Problem List Items Addressed This Visit        ENT    Hearing loss    Relevant Orders    Ambulatory Referral to ENT      Other Visit Diagnoses     Routine general medical examination at a health care facility    -  Primary          Follow up if symptoms worsen or fail to improve.

## 2019-11-15 LAB
CITY: NORMAL
COUNTY: NORMAL
GUARDIAN FIRST NAME: NORMAL
GUARDIAN LAST NAME: NORMAL
LEAD BLD-MCNC: <1 MCG/DL (ref 0–4.9)
PHONE #: NORMAL
POSTAL CODE: NORMAL
RACE: NORMAL
SPECIMEN SOURCE: NORMAL
STATE OF RESIDENCE: NORMAL
STREET ADDRESS: NORMAL

## 2020-12-31 ENCOUNTER — CLINICAL SUPPORT (OUTPATIENT)
Dept: INTERNAL MEDICINE | Facility: CLINIC | Age: 40
End: 2020-12-31

## 2020-12-31 ENCOUNTER — CLINICAL SUPPORT (OUTPATIENT)
Dept: AUDIOLOGY | Facility: CLINIC | Age: 40
End: 2020-12-31

## 2020-12-31 ENCOUNTER — OFFICE VISIT (OUTPATIENT)
Dept: INTERNAL MEDICINE | Facility: CLINIC | Age: 40
End: 2020-12-31

## 2020-12-31 VITALS
WEIGHT: 212.31 LBS | BODY MASS INDEX: 28.76 KG/M2 | SYSTOLIC BLOOD PRESSURE: 124 MMHG | OXYGEN SATURATION: 97 % | DIASTOLIC BLOOD PRESSURE: 78 MMHG | HEART RATE: 68 BPM | TEMPERATURE: 98 F | HEIGHT: 72 IN

## 2020-12-31 DIAGNOSIS — Z00.00 ROUTINE GENERAL MEDICAL EXAMINATION AT A HEALTH CARE FACILITY: Primary | ICD-10-CM

## 2020-12-31 DIAGNOSIS — Z01.12 HEARING CONSERVATION AND TREATMENT EXAM: Primary | ICD-10-CM

## 2020-12-31 DIAGNOSIS — Z71.3 DIETARY COUNSELING: Primary | ICD-10-CM

## 2020-12-31 DIAGNOSIS — H91.93 BILATERAL HEARING LOSS, UNSPECIFIED HEARING LOSS TYPE: ICD-10-CM

## 2020-12-31 PROBLEM — M79.605 LUMBAR PAIN WITH RADIATION DOWN LEFT LEG: Status: ACTIVE | Noted: 2020-12-02

## 2020-12-31 PROBLEM — M54.50 LUMBAR PAIN WITH RADIATION DOWN LEFT LEG: Status: ACTIVE | Noted: 2020-12-02

## 2020-12-31 LAB
ALBUMIN SERPL BCP-MCNC: 4.2 G/DL (ref 3.5–5.2)
ALP SERPL-CCNC: 43 U/L (ref 55–135)
ALT SERPL W/O P-5'-P-CCNC: 33 U/L (ref 10–44)
ANION GAP SERPL CALC-SCNC: 10 MMOL/L (ref 8–16)
AST SERPL-CCNC: 19 U/L (ref 10–40)
BILIRUB SERPL-MCNC: 0.6 MG/DL (ref 0.1–1)
BILIRUB UR QL STRIP: NEGATIVE
BUN SERPL-MCNC: 20 MG/DL (ref 6–20)
CALCIUM SERPL-MCNC: 9.1 MG/DL (ref 8.7–10.5)
CHLORIDE SERPL-SCNC: 103 MMOL/L (ref 95–110)
CHOLEST SERPL-MCNC: 156 MG/DL (ref 120–199)
CHOLEST/HDLC SERPL: 4.2 {RATIO} (ref 2–5)
CLARITY UR: CLEAR
CO2 SERPL-SCNC: 23 MMOL/L (ref 23–29)
COLOR UR: YELLOW
CREAT SERPL-MCNC: 1 MG/DL (ref 0.5–1.4)
ERYTHROCYTE [DISTWIDTH] IN BLOOD BY AUTOMATED COUNT: 12.2 % (ref 11.5–14.5)
EST. GFR  (AFRICAN AMERICAN): >60 ML/MIN/1.73 M^2
EST. GFR  (NON AFRICAN AMERICAN): >60 ML/MIN/1.73 M^2
ESTIMATED AVG GLUCOSE: 91 MG/DL (ref 68–131)
GLUCOSE SERPL-MCNC: 100 MG/DL (ref 70–110)
GLUCOSE UR QL STRIP: NEGATIVE
HBA1C MFR BLD HPLC: 4.8 % (ref 4–5.6)
HCT VFR BLD AUTO: 41.3 % (ref 40–54)
HDLC SERPL-MCNC: 37 MG/DL (ref 40–75)
HDLC SERPL: 23.7 % (ref 20–50)
HGB BLD-MCNC: 15 G/DL (ref 14–18)
HGB UR QL STRIP: NEGATIVE
KETONES UR QL STRIP: NEGATIVE
LDLC SERPL CALC-MCNC: 95.2 MG/DL (ref 63–159)
LEUKOCYTE ESTERASE UR QL STRIP: NEGATIVE
MCH RBC QN AUTO: 31.1 PG (ref 27–31)
MCHC RBC AUTO-ENTMCNC: 36.3 G/DL (ref 32–36)
MCV RBC AUTO: 86 FL (ref 82–98)
NITRITE UR QL STRIP: NEGATIVE
NONHDLC SERPL-MCNC: 119 MG/DL
PH UR STRIP: 6 [PH] (ref 5–8)
PLATELET # BLD AUTO: 249 K/UL (ref 150–350)
PMV BLD AUTO: 9.1 FL (ref 9.2–12.9)
POTASSIUM SERPL-SCNC: 4.1 MMOL/L (ref 3.5–5.1)
PROT SERPL-MCNC: 7.5 G/DL (ref 6–8.4)
PROT UR QL STRIP: NEGATIVE
RBC # BLD AUTO: 4.82 M/UL (ref 4.6–6.2)
SODIUM SERPL-SCNC: 136 MMOL/L (ref 136–145)
SP GR UR STRIP: 1.02 (ref 1–1.03)
TRIGL SERPL-MCNC: 119 MG/DL (ref 30–150)
TSH SERPL DL<=0.005 MIU/L-ACNC: 1.94 UIU/ML (ref 0.4–4)
URN SPEC COLLECT METH UR: NORMAL
WBC # BLD AUTO: 5.37 K/UL (ref 3.9–12.7)

## 2020-12-31 PROCEDURE — 99999 PR PBB SHADOW E&M-EST. PATIENT-LVL III: ICD-10-PCS | Mod: PBBFAC,,, | Performed by: FAMILY MEDICINE

## 2020-12-31 PROCEDURE — 99213 OFFICE O/P EST LOW 20 MIN: CPT | Mod: PBBFAC,25 | Performed by: FAMILY MEDICINE

## 2020-12-31 PROCEDURE — 97802 PR MED NUTR THER, 1ST, INDIV, EA 15 MIN: ICD-10-PCS | Mod: S$GLB,,, | Performed by: DIETITIAN, REGISTERED

## 2020-12-31 PROCEDURE — 84443 ASSAY THYROID STIM HORMONE: CPT

## 2020-12-31 PROCEDURE — 97802 MEDICAL NUTRITION INDIV IN: CPT | Mod: S$GLB,,, | Performed by: DIETITIAN, REGISTERED

## 2020-12-31 PROCEDURE — 99396 PR PREVENTIVE VISIT,EST,40-64: ICD-10-PCS | Mod: S$PBB,,, | Performed by: FAMILY MEDICINE

## 2020-12-31 PROCEDURE — 85027 COMPLETE CBC AUTOMATED: CPT

## 2020-12-31 PROCEDURE — 83036 HEMOGLOBIN GLYCOSYLATED A1C: CPT

## 2020-12-31 PROCEDURE — 81003 URINALYSIS AUTO W/O SCOPE: CPT

## 2020-12-31 PROCEDURE — 83655 ASSAY OF LEAD: CPT

## 2020-12-31 PROCEDURE — 99396 PREV VISIT EST AGE 40-64: CPT | Mod: S$PBB,,, | Performed by: FAMILY MEDICINE

## 2020-12-31 PROCEDURE — 80061 LIPID PANEL: CPT

## 2020-12-31 PROCEDURE — 92552 PURE TONE AUDIOMETRY AIR: CPT | Mod: PBBFAC | Performed by: AUDIOLOGIST-HEARING AID FITTER

## 2020-12-31 PROCEDURE — 99999 PR PBB SHADOW E&M-EST. PATIENT-LVL III: CPT | Mod: PBBFAC,,, | Performed by: FAMILY MEDICINE

## 2020-12-31 PROCEDURE — 80053 COMPREHEN METABOLIC PANEL: CPT

## 2020-12-31 RX ORDER — SERTRALINE HYDROCHLORIDE 50 MG/1
50 TABLET, FILM COATED ORAL
COMMUNITY
Start: 2020-09-17

## 2020-12-31 RX ORDER — IBUPROFEN 800 MG/1
TABLET ORAL
COMMUNITY
Start: 2020-11-30

## 2020-12-31 NOTE — PROGRESS NOTES
Executive Health Screening    Rip Lopez was seen 12/31/2020 for an executive health hearing screen.      Results reveal a mild-to-moderate hearing loss 250-8000 Hz for the right ear, and  Mild to moderately severe hearing loss 250-8000 Hz for the left ear.     Otoscopy was unremarkable.    Recommendations:  1.  Complete Audiological Evaluation  2.  ENT followup  3.  Hearing aid consult pending candidacy   4.  Wear hearing protective devices around loud noise  5.  Annual audiograms    Patient was counseled on the above findings.

## 2020-12-31 NOTE — PROGRESS NOTES
Subjective:   Patient ID: Rip Lopez is a 40 y.o. male.  Chief Complaint:  Executive Health    Presents for executive Health evaluation 4.   Medical history for melanoma, anxiety, lumbar degenerative disc disease , and bilateral hearing loss.  Surgical history for skin cancer excision and appendectomy   Current medications Zoloft 50 mg daily and Motrin 800 mg 3 times a day as needed   No known drug allergies  Social history employed by the [employer].  .  Three children. No tobacco, alcohol, illicit drug use.  Family history includes father, alive, diabetes, hypertension, heart disease, and ascites.  Mother, alive, seizures.  No known colon or prostate cancer.    Health maintenance with tetanus booster November 2018 reported flu vaccine October 2020.  No specific complaints today.    Review of Systems   Constitutional: Negative for chills, fatigue and fever.   HENT: Negative for congestion, dental problem, ear discharge, ear pain, postnasal drip, rhinorrhea, sinus pressure, sinus pain, sore throat and trouble swallowing.    Eyes: Negative for pain, redness and visual disturbance.   Respiratory: Negative for cough, chest tightness, shortness of breath and wheezing.    Cardiovascular: Negative for chest pain, palpitations and leg swelling.   Gastrointestinal: Negative for abdominal pain, constipation, diarrhea, nausea and vomiting.   Endocrine: Negative for polydipsia, polyphagia and polyuria.   Genitourinary: Negative for difficulty urinating, dysuria, flank pain, frequency, hematuria and urgency.   Musculoskeletal: Negative for myalgias.   Skin: Negative for rash.   Neurological: Negative for dizziness, weakness, light-headedness and headaches.   Hematological: Negative for adenopathy.   Psychiatric/Behavioral: Negative for sleep disturbance. The patient is not nervous/anxious.      Objective:   /78 (BP Location: Left arm, Patient Position: Sitting, BP Method: Large (Manual))    Pulse 68   Temp 97.5 °F (36.4 °C) (Tympanic)   Ht 6' (1.829 m)   Wt 96.3 kg (212 lb 4.9 oz)   SpO2 97%   BMI 28.79 kg/m²     Physical Exam  Vitals signs and nursing note reviewed.   Constitutional:       Appearance: Normal appearance. He is well-developed.   HENT:      Right Ear: Hearing, tympanic membrane, ear canal and external ear normal.      Left Ear: Hearing, tympanic membrane, ear canal and external ear normal.      Nose: Nose normal. No mucosal edema, congestion or rhinorrhea.      Right Turbinates: Not enlarged or swollen.      Left Turbinates: Not enlarged or swollen.      Right Sinus: No maxillary sinus tenderness or frontal sinus tenderness.      Left Sinus: No maxillary sinus tenderness or frontal sinus tenderness.      Mouth/Throat:      Mouth: No oral lesions.      Pharynx: Oropharynx is clear. Uvula midline.      Tonsils: No tonsillar exudate.   Eyes:      General: No scleral icterus.        Right eye: No discharge.         Left eye: No discharge.      Conjunctiva/sclera: Conjunctivae normal.      Right eye: Right conjunctiva is not injected.      Left eye: Left conjunctiva is not injected.   Neck:      Thyroid: No thyroid mass, thyromegaly or thyroid tenderness.      Vascular: No JVD.   Cardiovascular:      Rate and Rhythm: Normal rate and regular rhythm.      Pulses:           Radial pulses are 2+ on the right side and 2+ on the left side.      Heart sounds: Normal heart sounds. No murmur. No friction rub. No gallop.    Pulmonary:      Effort: Pulmonary effort is normal. No accessory muscle usage or respiratory distress.      Breath sounds: Normal breath sounds. No wheezing, rhonchi or rales.   Abdominal:      General: There is no distension.      Palpations: Abdomen is soft.      Tenderness: There is no abdominal tenderness. There is no guarding or rebound.   Musculoskeletal:      Right lower leg: No edema.      Left lower leg: No edema.   Lymphadenopathy:      Cervical: No cervical  adenopathy.   Skin:     General: Skin is warm and dry.      Findings: No rash.   Neurological:      Mental Status: He is alert and oriented to person, place, and time.      Coordination: Coordination normal.      Gait: Gait normal.   Psychiatric:         Attention and Perception: Attention and perception normal.         Mood and Affect: Mood normal.         Speech: Speech normal.         Behavior: Behavior normal.         Thought Content: Thought content normal.         Cognition and Memory: Cognition and memory normal.         Judgment: Judgment normal.     CBC with normal white cell count, red cell count, and platelet level.    CMP with normal glucose, kidney, liver, electrolytes.    Total cholesterol 156.  Triglycerides 119. HDL 37. LDL 95. Ten year cardiovascular risk 1%.    Urinalysis normal.    TSH, A1c, and lead level pending.    Assessment:       ICD-10-CM ICD-9-CM   1. Routine general medical examination at a health care facility  Z00.00 V70.0   2. Bilateral hearing loss, unspecified hearing loss type  H91.93 389.9     Plan:   Routine general medical examination at a health care facility  Blood pressure normal.  BMI 29.  Remainder exam stable.    Full executive Health letter when all test resulted   Tetanus booster up-to-date  Reported flu vaccine up-to-date     Bilateral hearing loss, unspecified hearing loss type  Bilateral hearing loss based on last year's audiology exam.    Due for repeat annual audiogram this year.      Return to clinic 1 year for executive Health evaluation.

## 2020-12-31 NOTE — PROGRESS NOTES
Nutrition Assessment  Client name:  Rip Lopez  :  1980  Age:  40 y.o.  Gender:  male    Client states:  Very pleasant employee from [employer] (employed 6 years) here for his annual physical with [employer]. Reports being  and has 3 children. Does not exercise but remains active with ADLs. He reports struggling with portion control and recently, holiday candy.  He states that he frequently eats very little all day and then overeats at the evening meal, wine daily with dinner. Rarely dines out and tries to focus on protein and vegetables at supper.    Anthropometrics  Height:  6'     Weight:  212 lb  BMI:  28.8  % Body Fat:  NA    Clinical Signs/Symptoms  N/V/D:  None  Appetite:  good       Past Medical History:   Diagnosis Date    History of melanoma        Past Surgical History:   Procedure Laterality Date    APPENDECTOMY      SKIN CANCER EXCISION         Medications    currently has no medications in their medication list.    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 Snack:  Candy  *Fluid intake:  Water, coffee    Exercise History:  No exercise regimen at this time    Cultural/Spiritual/Personal Preferences:  None identified    Support System:  spouse    State of Change:  Contemplation    Barriers to Change:  Time  and Lack of motivation    Diagnosis    Overweight related to excessive energy intake and inadequate physical activity as evidenced by patient self-reported diet and exercise history and BMI 28.8.    Intervention    RMR (Method:  InBody):  1909 kcal  Activity Factor:  1.3    BRIELLE:  2481 - 500 = 1981 kcal    Goals:  1.  Aim for 3 meals daily  2.  Balance lunch and dinner by incorporating 1/2 plate veg, 1/4 plate grain/starch and 1/4 plate lean protein  3.  Aim for 150 minutes exercise weekly, as tolerated    Nutrition Education  Lab results were pending during time of consult, therefore were not discussed. Heart-health diet education was discussed including benefits of weight loss, physical activity and strategies to increase fruit and vegetable intake while limiting sodium and saturated fat.  Recommended avoid skipping meals as this can contribute to compensatory overeating and snacking later in the day. Reviewed and provided Eat Fit Shopping guide and dining out materials. Reviewed the benefits of physical activity and encouraged aim for a minimum of 150 minutes per week as tolerated. Encouraged small, practical changes to start to create new, sustainable healthy habits.    Patient verbalized understanding of nutrition education and recommendations received.    Handouts Provided  Meal Planning Guide  Restaurant Guide  Eat Fit Shopping List  Eat Fit Danette  Fast Food Guide  The Plate Method    Monitoring/Evaluation    Monitor the following:  Weight  BMI  % Body Fat  Caloric intake  Labs:  CMP, Hgb A1c, Lipid Panel    Follow Up Plan:  Communication with referring healthcare provider is unnecessary at this time as patient presented as part of annual wellness exam.  However, will follow up with patient in 1-2 years.

## 2021-01-02 LAB
LEAD BLD-MCNC: <1 MCG/DL
SPECIMEN SOURCE: NORMAL
STATE OF RESIDENCE: NORMAL

## 2021-01-22 ENCOUNTER — CLINICAL SUPPORT (OUTPATIENT)
Dept: OTHER | Facility: CLINIC | Age: 41
End: 2021-01-22

## 2021-01-22 DIAGNOSIS — Z00.8 ENCOUNTER FOR OTHER GENERAL EXAMINATION: ICD-10-CM

## 2021-01-23 VITALS — HEIGHT: 70 IN | BODY MASS INDEX: 30.46 KG/M2

## 2021-01-23 LAB
GLUCOSE SERPL-MCNC: 108 MG/DL (ref 60–140)
HDLC SERPL-MCNC: 41 MG/DL
POC CHOLESTEROL, LDL (DOCK): 93 MG/DL
POC CHOLESTEROL, TOTAL: 168 MG/DL
TRIGL SERPL-MCNC: 172 MG/DL

## 2021-12-29 ENCOUNTER — OFFICE VISIT (OUTPATIENT)
Dept: INTERNAL MEDICINE | Facility: CLINIC | Age: 41
End: 2021-12-29

## 2021-12-29 ENCOUNTER — CLINICAL SUPPORT (OUTPATIENT)
Dept: AUDIOLOGY | Facility: CLINIC | Age: 41
End: 2021-12-29

## 2021-12-29 ENCOUNTER — CLINICAL SUPPORT (OUTPATIENT)
Dept: INTERNAL MEDICINE | Facility: CLINIC | Age: 41
End: 2021-12-29

## 2021-12-29 VITALS
HEART RATE: 71 BPM | SYSTOLIC BLOOD PRESSURE: 126 MMHG | WEIGHT: 216.06 LBS | OXYGEN SATURATION: 98 % | TEMPERATURE: 99 F | DIASTOLIC BLOOD PRESSURE: 70 MMHG | HEIGHT: 70 IN | BODY MASS INDEX: 30.93 KG/M2

## 2021-12-29 DIAGNOSIS — Z71.3 DIETARY COUNSELING: ICD-10-CM

## 2021-12-29 DIAGNOSIS — Z00.00 PREVENTATIVE HEALTH CARE: Primary | ICD-10-CM

## 2021-12-29 DIAGNOSIS — Z85.820 HISTORY OF MELANOMA: ICD-10-CM

## 2021-12-29 DIAGNOSIS — E66.09 CLASS 1 OBESITY DUE TO EXCESS CALORIES WITHOUT SERIOUS COMORBIDITY WITH BODY MASS INDEX (BMI) OF 31.0 TO 31.9 IN ADULT: ICD-10-CM

## 2021-12-29 DIAGNOSIS — Z01.12 HEARING CONSERVATION AND TREATMENT EXAM: Primary | ICD-10-CM

## 2021-12-29 DIAGNOSIS — H91.93 BILATERAL HEARING LOSS, UNSPECIFIED HEARING LOSS TYPE: Chronic | ICD-10-CM

## 2021-12-29 DIAGNOSIS — Z00.00 ROUTINE GENERAL MEDICAL EXAMINATION AT A HEALTH CARE FACILITY: Primary | ICD-10-CM

## 2021-12-29 PROBLEM — F90.9 ADHD: Status: ACTIVE | Noted: 2021-12-27

## 2021-12-29 LAB
ALBUMIN SERPL BCP-MCNC: 4.2 G/DL (ref 3.5–5.2)
ALP SERPL-CCNC: 41 U/L (ref 55–135)
ALT SERPL W/O P-5'-P-CCNC: 42 U/L (ref 10–44)
ANION GAP SERPL CALC-SCNC: 9 MMOL/L (ref 8–16)
AST SERPL-CCNC: 24 U/L (ref 10–40)
BILIRUB SERPL-MCNC: 1.1 MG/DL (ref 0.1–1)
BILIRUB UR QL STRIP: NEGATIVE
BUN SERPL-MCNC: 20 MG/DL (ref 6–20)
CALCIUM SERPL-MCNC: 9.3 MG/DL (ref 8.7–10.5)
CHLORIDE SERPL-SCNC: 108 MMOL/L (ref 95–110)
CHOLEST SERPL-MCNC: 166 MG/DL (ref 120–199)
CHOLEST/HDLC SERPL: 3.9 {RATIO} (ref 2–5)
CLARITY UR: CLEAR
CO2 SERPL-SCNC: 23 MMOL/L (ref 23–29)
COLOR UR: YELLOW
CREAT SERPL-MCNC: 0.9 MG/DL (ref 0.5–1.4)
ERYTHROCYTE [DISTWIDTH] IN BLOOD BY AUTOMATED COUNT: 12.6 % (ref 11.5–14.5)
EST. GFR  (AFRICAN AMERICAN): >60 ML/MIN/1.73 M^2
EST. GFR  (NON AFRICAN AMERICAN): >60 ML/MIN/1.73 M^2
ESTIMATED AVG GLUCOSE: 88 MG/DL (ref 68–131)
GLUCOSE SERPL-MCNC: 96 MG/DL (ref 70–110)
GLUCOSE UR QL STRIP: NEGATIVE
HBA1C MFR BLD: 4.7 % (ref 4–5.6)
HCT VFR BLD AUTO: 41.4 % (ref 40–54)
HDLC SERPL-MCNC: 43 MG/DL (ref 40–75)
HDLC SERPL: 25.9 % (ref 20–50)
HGB BLD-MCNC: 14.8 G/DL (ref 14–18)
HGB UR QL STRIP: NEGATIVE
KETONES UR QL STRIP: NEGATIVE
LDLC SERPL CALC-MCNC: 105.2 MG/DL (ref 63–159)
LEUKOCYTE ESTERASE UR QL STRIP: NEGATIVE
MCH RBC QN AUTO: 31 PG (ref 27–31)
MCHC RBC AUTO-ENTMCNC: 35.7 G/DL (ref 32–36)
MCV RBC AUTO: 87 FL (ref 82–98)
NITRITE UR QL STRIP: NEGATIVE
NONHDLC SERPL-MCNC: 123 MG/DL
PH UR STRIP: 6 [PH] (ref 5–8)
PLATELET # BLD AUTO: 249 K/UL (ref 150–450)
PMV BLD AUTO: 9.3 FL (ref 9.2–12.9)
POTASSIUM SERPL-SCNC: 4.1 MMOL/L (ref 3.5–5.1)
PROT SERPL-MCNC: 7.4 G/DL (ref 6–8.4)
PROT UR QL STRIP: NEGATIVE
RBC # BLD AUTO: 4.77 M/UL (ref 4.6–6.2)
SODIUM SERPL-SCNC: 140 MMOL/L (ref 136–145)
SP GR UR STRIP: >=1.03 (ref 1–1.03)
TRIGL SERPL-MCNC: 89 MG/DL (ref 30–150)
TSH SERPL DL<=0.005 MIU/L-ACNC: 1.06 UIU/ML (ref 0.4–4)
URN SPEC COLLECT METH UR: ABNORMAL
WBC # BLD AUTO: 4.85 K/UL (ref 3.9–12.7)

## 2021-12-29 PROCEDURE — 81003 URINALYSIS AUTO W/O SCOPE: CPT | Performed by: FAMILY MEDICINE

## 2021-12-29 PROCEDURE — 99999 PR PBB SHADOW E&M-EST. PATIENT-LVL III: CPT | Mod: PBBFAC,,, | Performed by: FAMILY MEDICINE

## 2021-12-29 PROCEDURE — 97802 MEDICAL NUTRITION INDIV IN: CPT | Mod: S$GLB,,, | Performed by: INTERNAL MEDICINE

## 2021-12-29 PROCEDURE — 85027 COMPLETE CBC AUTOMATED: CPT | Performed by: FAMILY MEDICINE

## 2021-12-29 PROCEDURE — 99396 PREV VISIT EST AGE 40-64: CPT | Mod: S$PBB,,, | Performed by: FAMILY MEDICINE

## 2021-12-29 PROCEDURE — 99213 OFFICE O/P EST LOW 20 MIN: CPT | Mod: PBBFAC | Performed by: FAMILY MEDICINE

## 2021-12-29 PROCEDURE — 97802 PR MED NUTR THER, 1ST, INDIV, EA 15 MIN: ICD-10-PCS | Mod: S$GLB,,, | Performed by: INTERNAL MEDICINE

## 2021-12-29 PROCEDURE — 92552 PURE TONE AUDIOMETRY AIR: CPT | Mod: PBBFAC | Performed by: AUDIOLOGIST

## 2021-12-29 PROCEDURE — 83036 HEMOGLOBIN GLYCOSYLATED A1C: CPT | Performed by: FAMILY MEDICINE

## 2021-12-29 PROCEDURE — 99396 PR PREVENTIVE VISIT,EST,40-64: ICD-10-PCS | Mod: S$PBB,,, | Performed by: FAMILY MEDICINE

## 2021-12-29 PROCEDURE — 99999 PR PBB SHADOW E&M-EST. PATIENT-LVL III: ICD-10-PCS | Mod: PBBFAC,,, | Performed by: FAMILY MEDICINE

## 2021-12-29 PROCEDURE — 80053 COMPREHEN METABOLIC PANEL: CPT | Performed by: FAMILY MEDICINE

## 2021-12-29 PROCEDURE — 80061 LIPID PANEL: CPT | Performed by: FAMILY MEDICINE

## 2021-12-29 PROCEDURE — 84443 ASSAY THYROID STIM HORMONE: CPT | Performed by: FAMILY MEDICINE

## 2021-12-29 PROCEDURE — 83655 ASSAY OF LEAD: CPT | Performed by: FAMILY MEDICINE

## 2021-12-29 RX ORDER — DEXTROAMPHETAMINE SACCHARATE, AMPHETAMINE ASPARTATE, DEXTROAMPHETAMINE SULFATE AND AMPHETAMINE SULFATE 2.5; 2.5; 2.5; 2.5 MG/1; MG/1; MG/1; MG/1
TABLET ORAL
COMMUNITY
Start: 2021-12-27

## 2021-12-29 NOTE — LETTER
" ROSEY Jeffers MD  [facility]  [address]    [place] [address]  PH: [phone]    FX: [phone]          2022        Jhon eLa MD  [address]  [address]  [place] [address]       RE:  Rip Lopez,  1980  (our MRN [id number])      Dear Dr. Lea:    I saw Rip Lopez 2021 as part of [facility] program.    Rip identified you as his primary care provider, and he asked me to send you a copy of his recent test results. A copy is enclosed.    We measured his vital signs as follows: His height is 5' 10" (1.778 m) and weight is 98 kg (216 lb 0.8 oz). His tympanic temperature is 98.8 °F (37.1 °C). His blood pressure is 126/70 and his pulse is 71. His oxygen saturation is 98%.     The primary encounter diagnosis was Preventative health care. Diagnoses of History of melanoma, Bilateral hearing loss, unspecified hearing loss type, and Class 1 obesity with body mass index (BMI) of 31.0 to 31.9 were also pertinent to this visit.     If we identified any problems or concerns during his Executive Health exam, I instructed Rip to follow-up with you to discuss these, as well as any health maintenance interventions that may be due.    Please call me if you have any questions or if I can be of any further service.    Kind regards,     ROSEY Jeffers MD    Enclosure       Lab Test Results for Rip Lopez,  1980    Component Date Value Ref Range    Sodium 2021 140  136 - 145 mmol/L    Potassium 2021 4.1  3.5 - 5.1 mmol/L    Chloride 2021 108  95 - 110 mmol/L    CO2 2021 23  23 - 29 mmol/L    Glucose 2021 96  70 - 110 mg/dL    BUN 2021 20  6 - 20 mg/dL    Creatinine 2021 0.9  0.5 - 1.4 mg/dL    Calcium 2021 9.3  8.7 - 10.5 mg/dL    Total Protein 2021 7.4  6.0 - 8.4 g/dL    Albumin 2021 4.2  3.5 - 5.2 g/dL    " Total Bilirubin 2021 1.1* 0.1 - 1.0 mg/dL    Alkaline Phosphatase 2021 41* 55 - 135 U/L    AST 2021 24  10 - 40 U/L    ALT 2021 42  10 - 44 U/L    Anion Gap 2021 9  8 - 16 mmol/L    eGFR if African American 2021 >60  >60 mL/min/1.73 m^2    eGFR if non African American 2021 >60  >60 mL/min/1.73 m^2    WBC 2021 4.85  3.90 - 12.70 K/uL    RBC 2021 4.77  4.60 - 6.20 M/uL    Hemoglobin 2021 14.8  14.0 - 18.0 g/dL    Hematocrit 2021 41.4  40.0 - 54.0 %    MCV 2021 87  82 - 98 fL    MCH 2021 31.0  27.0 - 31.0 pg    MCHC 2021 35.7  32.0 - 36.0 g/dL    RDW 2021 12.6  11.5 - 14.5 %    Platelets 2021 249  150 - 450 K/uL    MPV 2021 9.3  9.2 - 12.9 fL    Cholesterol 2021 166  120 - 199 mg/dL    Triglycerides 2021 89  30 - 150 mg/dL    HDL 2021 43  40 - 75 mg/dL    LDL Cholesterol 2021 105.2  63.0 - 159.0 mg/dL    HDL/Cholesterol Ratio 2021 25.9  20.0 - 50.0 %    Total Cholesterol/HDL Ratio 2021 3.9  2.0 - 5.0    Non-HDL Cholesterol 2021 123  mg/dL    TSH 2021 1.061  0.400 - 4.000 uIU/mL    Hemoglobin A1C 2021 4.7  4.0 - 5.6 %    Estimated Avg Glucose 2021 88  68 - 131 mg/dL    Lead, Blood, , Venous 2021 <1.0  <5.0 mcg/dL    Color, UA, Urine, Clean Catch 2021 Yellow  Yellow, Straw, Libby    Appearance, UA 2021 Clear  Clear    pH, UA 2021 6.0  5.0 - 8.0    Specific Gravity, UA 2021 >=1.030* 1.005 - 1.030    Protein, UA 2021 Negative  Negative    Glucose, UA 2021 Negative  Negative    Ketones, UA 2021 Negative  Negative    Bilirubin (UA) 2021 Negative  Negative    Occult Blood UA 2021 Negative  Negative    Nitrite, UA 2021 Negative  Negative    Leukocytes, UA 2021 Negative  Negative           Audiometry Results for Alejandrina Echevarria 1980      Results reveal  a mild-to-severe hearing loss 1491-6712 Hz for the right ear, and mild-to-severe hearing loss 8757-3478 Hz for the left ear. Otoscopy was unremarkable.   Recommendations include: 1. Complete Audiological Evaluation. 2. ENT follow-up. 3. Possible Hearing aid consult pending candidacy. 4. Wear hearing protective devices around loud noise. 5. Annual audiograms.

## 2021-12-29 NOTE — LETTER
01/09/2022        EULALIA HARRIS   [address] [place] [address]        Dear Eulalia,    I enjoyed meeting you for your Executive Health exam on 12/29/2021.    This letter and the accompanying report will serve as a summary of the medical history you provided, your physical exam findings, and your test results. As you requested, I'm sending a copy of your test results to your PCP (primary care provider), Jhon Lea MD.    In summary:   Your physical exam was normal.   Your lab results are normal or at least acceptable and do not require further evaluation or treatment at this point.   Your audiometry results reveal mild to severe hearing loss in both ears.    Details of your test results are included in the accompanying report. Send me a Patient Portal message if you have any questions.    Thanks for letting me care for you, and thanks for trusting [facility] with your healthcare needs.    All the best,     ROSEY Jeffers MD     ENCLOSURE    EXECUTIVE HEALTH ENCOUNTER      REASON FOR VISIT  EXECUTIVE HEALTH    HEALTH MAINTENANCE INTERVENTIONS - UP TO DATE  Health Maintenance Topics with due status: Not Due       Topic Last Completion Date    TETANUS VACCINE 11/27/2018    Lipid Panel 12/29/2021       HEALTH MAINTENANCE INTERVENTIONS - DUE OR DUE SOON  Health Maintenance Due   Topic Date Due    Hepatitis C Screening  Never done    COVID-19 Vaccine (2 - Pfizer 3-dose series) 08/27/2021    Influenza Vaccine (1) 09/01/2021       PCP (Primary Care Provider)  Eulalia identifies his PCP as Jhon Lea MD.    DIAGNOSES  The primary encounter diagnosis was Preventative health care. Diagnoses of History of melanoma, Bilateral hearing loss, unspecified hearing loss type, and Class 1 obesity with body mass index (BMI) of 31.0 to 31.9 were also pertinent to this visit.    Problem List Items Addressed This Visit     Bilateral hearing loss (Chronic)    Overview     12/29/2021 Otoscopy Results  "reveal a mild-to-severe hearing loss 5816-0793 Hz for the right ear, and mild-to-severe hearing loss 7361-5693 Hz for the left ear.         Class 1 obesity with body mass index (BMI) of 31.0 to 31.9    Current Assessment & Plan     Wt Readings from Last 6 Encounters:   12/29/21 98 kg (216 lb 0.8 oz)   12/31/20 96.3 kg (212 lb 4.9 oz)   11/13/19 93 kg (205 lb 0.4 oz)   11/27/18 93 kg (205 lb 0.4 oz)   11/27/18 93 kg (205 lb 0.4 oz)   02/01/17 90.7 kg (200 lb)     Estimated body mass index is 31 kg/m² as calculated from the following:    Height as of this encounter: 5' 10" (1.778 m).    Weight as of this encounter: 98 kg (216 lb 0.8 oz).           History of melanoma    Current Assessment & Plan     I recommend that you schedule an appointment with a dermatologist soon for head-to-toe skin cancer screening.           Other Visit Diagnoses     Preventative health care    -  Primary        PHYSICAL EXAM  Vitals:    12/29/21 1100   BP: 126/70   BP Location: Left arm   Patient Position: Sitting   BP Method: Large (Automatic)   Pulse: 71   Temp: 98.8 °F (37.1 °C)   TempSrc: Tympanic   SpO2: 98%   Weight: 98 kg (216 lb 0.8 oz)   Height: 5' 10" (1.778 m)   CONSTITUTIONAL: No apparent distress. Normal appearance.   EYES: No scleral icterus. Conjunctivae unremarkable, not icteric.  NECK: No thyroid mass, thyromegaly or thyroid tenderness. No carotid bruit.   CARDIOVASCULAR: Normal rate and regular rhythm. Normal heart sounds.   PULMONARY: Pulmonary effort is normal. No respiratory distress. Normal breath sounds. No wheezing or rhonchi.   ABDOMINAL: Bowel sounds are normal. There is no distension. Abdomen is soft. There is no mass. There is no abdominal tenderness.   SKIN: Skin is warm and dry. Skin is not jaundiced.   NEUROLOGICAL: No focal deficit apparent. Alert, oriented to person, place, and time.   PSYCHIATRIC: Mood normal. Behavior: Behavior normal. Judgment normal.     DATA SUMMARY    LABORATORY:    CBC: The complete " "blood count (CBC) checks for anemia and measures the red blood cells, white blood cells, and platelets in your blood.  o YOUR RESULTS: NORMAL. No further evaluation or treatment required.     CMP: The comprehensive metabolic panel (CMP) checks kidney function, liver function, sodium, potassium, glucose (sugar) and other aspects of your metabolism.  o YOUR RESULTS: NORMAL or at least ACCEPTABLE and do not require further evaluation or treatment at this point.     Lipid Panel: The lipid panel measures the levels of different types of fatty substances in your blood (cholesterol and triglycerides) that can contribute to plaque buildup in your arteries (atherosclerosis).  o YOUR RESULTS: NORMAL. No further evaluation or treatment required.     A1c: The hemoglobin A1c (A1c) is a test that evaluates and screens for diabetes.  o YOUR RESULTS: NORMAL. No further evaluation or treatment required.     Urinalysis: A test that examines urine. The test can be chemical, microscopic, or both.  o YOUR RESULTS: NORMAL or at least ACCEPTABLE and do not require further evaluation or treatment at this point.     Serum Lead Level:  A measure of the amount of lead (a toxic heavy metal) in your blood.  o YOUR RESULTS: NORMAL. No further evaluation or treatment required.     TSH: The thyroid is a gland in the neck that helps regulate metabolism. The thyroid stimulating hormone (TSH) is a measure of your thyroid activity.  o YOUR RESULTS: NORMAL. No further evaluation or treatment required.    Want to learn more about your lab results and what they mean?  (1) Log in to your [email or web address] account at [email or web address]  (2) From the "View test results" tab, click on the test you want to know more about.  (3) Click on the "About This Test" link.     OTHER:   Audiometry: Audiometry is a test that measures a person's ability to hear at various sound frequencies.  o YOUR RESULTS: Results reveal a mild-to-severe hearing loss 9128-4463 Hz for " "the right ear, and mild-to-severe hearing loss 5474-9409 Hz for the left ear. Otoscopy was unremarkable. Recommendations include: 1. Complete Audiological Evaluation. 2. ENT follow-up. 3. Possible Hearing aid consult pending candidacy. 4. Wear hearing protective devices around loud noise. 5. Annual audiograms.       MEDICATIONS  Outpatient Medications Prior to Visit   Medication Sig Dispense Refill    dextroamphetamine-amphetamine 10 mg Tab Take 1 pill bid      ibuprofen (ADVIL,MOTRIN) 800 MG tablet TAKE 1 PILL TWICE A DAY AFTER MEALS      sertraline (ZOLOFT) 50 MG tablet Take 50 mg by mouth.       No facility-administered medications prior to visit.   There are no discontinued medications.   FOLLOW-UP  Rip is to follow up with their PCP for routine Health Maintenance, any unresolved issues, and any new complaints or concerns.    PAST MEDICAL HISTORY  Rip has a past medical history of ADHD (attention deficit hyperactivity disorder), Anxiety, History of melanoma, and Lumbar pain with radiation down left leg.    SURGICAL HISTORY  Rip has a past surgical history that includes Skin cancer excision () and Appendectomy ().    FAMILY HISTORY  Rip family history includes Diabetes in his father; Heart disease in his father; Hypertension in his father; Seizures in his mother.     ALLERGIES  Review of patient's allergies indicates:  No Known Allergies    SOCIAL HISTORY  Rip  reports that he has never smoked. He has never used smokeless tobacco. He reports that he does not drink alcohol and does not use drugs.     Documentation entered by me for this encounter may have been done in part using speech-recognition technology. Although I have made an effort to ensure accuracy, "sound like" errors may exist and should be interpreted in context.       Lab Test Results for Rip Lopez,  1980    Component Date Value Ref Range    Sodium 2021 140  136 - 145 mmol/L    Potassium 2021 4.1  3.5 - " 5.1 mmol/L    Chloride 12/29/2021 108  95 - 110 mmol/L    CO2 12/29/2021 23  23 - 29 mmol/L    Glucose 12/29/2021 96  70 - 110 mg/dL    BUN 12/29/2021 20  6 - 20 mg/dL    Creatinine 12/29/2021 0.9  0.5 - 1.4 mg/dL    Calcium 12/29/2021 9.3  8.7 - 10.5 mg/dL    Total Protein 12/29/2021 7.4  6.0 - 8.4 g/dL    Albumin 12/29/2021 4.2  3.5 - 5.2 g/dL    Total Bilirubin 12/29/2021 1.1* 0.1 - 1.0 mg/dL    Alkaline Phosphatase 12/29/2021 41* 55 - 135 U/L    AST 12/29/2021 24  10 - 40 U/L    ALT 12/29/2021 42  10 - 44 U/L    Anion Gap 12/29/2021 9  8 - 16 mmol/L    eGFR if African American 12/29/2021 >60  >60 mL/min/1.73 m^2    eGFR if non African American 12/29/2021 >60  >60 mL/min/1.73 m^2    WBC 12/29/2021 4.85  3.90 - 12.70 K/uL    RBC 12/29/2021 4.77  4.60 - 6.20 M/uL    Hemoglobin 12/29/2021 14.8  14.0 - 18.0 g/dL    Hematocrit 12/29/2021 41.4  40.0 - 54.0 %    MCV 12/29/2021 87  82 - 98 fL    MCH 12/29/2021 31.0  27.0 - 31.0 pg    MCHC 12/29/2021 35.7  32.0 - 36.0 g/dL    RDW 12/29/2021 12.6  11.5 - 14.5 %    Platelets 12/29/2021 249  150 - 450 K/uL    MPV 12/29/2021 9.3  9.2 - 12.9 fL    Cholesterol 12/29/2021 166  120 - 199 mg/dL    Triglycerides 12/29/2021 89  30 - 150 mg/dL    HDL 12/29/2021 43  40 - 75 mg/dL    LDL Cholesterol 12/29/2021 105.2  63.0 - 159.0 mg/dL    HDL/Cholesterol Ratio 12/29/2021 25.9  20.0 - 50.0 %    Total Cholesterol/HDL Ratio 12/29/2021 3.9  2.0 - 5.0    Non-HDL Cholesterol 12/29/2021 123  mg/dL    TSH 12/29/2021 1.061  0.400 - 4.000 uIU/mL    Hemoglobin A1C 12/29/2021 4.7  4.0 - 5.6 %    Estimated Avg Glucose 12/29/2021 88  68 - 131 mg/dL    Lead, Blood, , Venous 12/29/2021 <1.0  <5.0 mcg/dL    Color, UA, Urine, Clean Catch 12/29/2021 Yellow  Yellow, Straw, Libby    Appearance, UA 12/29/2021 Clear  Clear    pH, UA 12/29/2021 6.0  5.0 - 8.0    Specific Gravity, UA 12/29/2021 >=1.030* 1.005 - 1.030    Protein, UA 12/29/2021 Negative  Negative     Glucose, UA 2021 Negative  Negative    Ketones, UA 2021 Negative  Negative    Bilirubin (UA) 2021 Negative  Negative    Occult Blood UA 2021 Negative  Negative    Nitrite, UA 2021 Negative  Negative    Leukocytes, UA 2021 Negative  Negative           Audiometry Results for Rip Lopez,  1980      Results reveal a mild-to-severe hearing loss 2641-2903 Hz for the right ear, and mild-to-severe hearing loss 2696-4785 Hz for the left ear. Otoscopy was unremarkable.   Recommendations include: 1. Complete Audiological Evaluation. 2. ENT follow-up. 3. Possible Hearing aid consult pending candidacy. 4. Wear hearing protective devices around loud noise. 5. Annual audiograms.

## 2021-12-29 NOTE — ASSESSMENT & PLAN NOTE
I recommend that you schedule an appointment with a dermatologist soon for head-to-toe skin cancer screening.

## 2021-12-29 NOTE — LETTER
" ROSEY Jeffers MD  [facility]  [address] [place] [address]  PH: [phone]    FX: [phone]          2022        ANAIS LEA MD  [address]  [place] [address]       RE:  Rip Lopez,  1980  (our MRN [id number])      Dear Dr. Lea:    I saw Rip Lopez 2021 as part of [facility] program.    Rip identified you as his primary care provider, and he asked me to send you a copy of his recent test results. A copy is enclosed.    We measured his vital signs as follows: His height is 5' 10" (1.778 m) and weight is 98 kg (216 lb 0.8 oz). His tympanic temperature is 98.8 °F (37.1 °C). His blood pressure is 126/70 and his pulse is 71. His oxygen saturation is 98%.     The primary encounter diagnosis was Preventative health care. Diagnoses of History of melanoma, Bilateral hearing loss, unspecified hearing loss type, and Class 1 obesity with body mass index (BMI) of 31.0 to 31.9 were also pertinent to this visit.     If we identified any problems or concerns during his Executive Health exam, I instructed Rip to follow-up with you to discuss these, as well as any health maintenance interventions that may be due.    Please call me if you have any questions or if I can be of any further service.    Kind regards,     ROSEY Jeffers MD    Enclosure       Lab Test Results for Rip Lopez,  1980    Component Date Value Ref Range    Sodium 2021 140  136 - 145 mmol/L    Potassium 2021 4.1  3.5 - 5.1 mmol/L    Chloride 2021 108  95 - 110 mmol/L    CO2 2021 23  23 - 29 mmol/L    Glucose 2021 96  70 - 110 mg/dL    BUN 2021 20  6 - 20 mg/dL    Creatinine 2021 0.9  0.5 - 1.4 mg/dL    Calcium 2021 9.3  8.7 - 10.5 mg/dL    Total Protein 2021 7.4  6.0 - 8.4 g/dL    Albumin 2021 4.2  3.5 - 5.2 g/dL    Total " Bilirubin 2021 1.1* 0.1 - 1.0 mg/dL    Alkaline Phosphatase 2021 41* 55 - 135 U/L    AST 2021 24  10 - 40 U/L    ALT 2021 42  10 - 44 U/L    Anion Gap 2021 9  8 - 16 mmol/L    eGFR if African American 2021 >60  >60 mL/min/1.73 m^2    eGFR if non African American 2021 >60  >60 mL/min/1.73 m^2    WBC 2021 4.85  3.90 - 12.70 K/uL    RBC 2021 4.77  4.60 - 6.20 M/uL    Hemoglobin 2021 14.8  14.0 - 18.0 g/dL    Hematocrit 2021 41.4  40.0 - 54.0 %    MCV 2021 87  82 - 98 fL    MCH 2021 31.0  27.0 - 31.0 pg    MCHC 2021 35.7  32.0 - 36.0 g/dL    RDW 2021 12.6  11.5 - 14.5 %    Platelets 2021 249  150 - 450 K/uL    MPV 2021 9.3  9.2 - 12.9 fL    Cholesterol 2021 166  120 - 199 mg/dL    Triglycerides 2021 89  30 - 150 mg/dL    HDL 2021 43  40 - 75 mg/dL    LDL Cholesterol 2021 105.2  63.0 - 159.0 mg/dL    HDL/Cholesterol Ratio 2021 25.9  20.0 - 50.0 %    Total Cholesterol/HDL Ratio 2021 3.9  2.0 - 5.0    Non-HDL Cholesterol 2021 123  mg/dL    TSH 2021 1.061  0.400 - 4.000 uIU/mL    Hemoglobin A1C 2021 4.7  4.0 - 5.6 %    Estimated Avg Glucose 2021 88  68 - 131 mg/dL    Lead, Blood, , Venous 2021 <1.0  <5.0 mcg/dL    Color, UA, Urine, Clean Catch 2021 Yellow  Yellow, Straw, Libby    Appearance, UA 2021 Clear  Clear    pH, UA 2021 6.0  5.0 - 8.0    Specific Gravity, UA 2021 >=1.030* 1.005 - 1.030    Protein, UA 2021 Negative  Negative    Glucose, UA 2021 Negative  Negative    Ketones, UA 2021 Negative  Negative    Bilirubin (UA) 2021 Negative  Negative    Occult Blood UA 2021 Negative  Negative    Nitrite, UA 2021 Negative  Negative    Leukocytes, UA 2021 Negative  Negative           Audiometry Results for Alejandrina Echevarria 1980      Results reveal a  mild-to-severe hearing loss 5563-6264 Hz for the right ear, and mild-to-severe hearing loss 2957-3306 Hz for the left ear. Otoscopy was unremarkable.   Recommendations include: 1. Complete Audiological Evaluation. 2. ENT follow-up. 3. Possible Hearing aid consult pending candidacy. 4. Wear hearing protective devices around loud noise. 5. Annual audiograms.

## 2021-12-29 NOTE — PROGRESS NOTES
Dear Rip,    I enjoyed meeting you for your Executive Health exam on 12/29/2021.    This letter and the accompanying report will serve as a summary of the medical history you provided, your physical exam findings, and your test results. As you requested, I'm sending a copy of your test results to your PCP (primary care provider), Jhon Lea MD.    In summary:   Your physical exam was normal.   Your test results are normal or at least acceptable and do not require further evaluation or treatment at this point.    Details of your test results are included in the accompanying report. Send me a Patient Portal message if you have any questions.    Thanks for letting me care for you, and thanks for trusting [facility] with your [facility] needs.    All the best,     ROSEY Jeffers MD     ENCLOSURE    EXECUTIVE HEALTH ENCOUNTER      REASON FOR VISIT  EXECUTIVE HEALTH    HEALTH MAINTENANCE INTERVENTIONS - UP TO DATE  Health Maintenance Topics with due status: Not Due       Topic Last Completion Date    TETANUS VACCINE 11/27/2018    Lipid Panel 12/29/2021       HEALTH MAINTENANCE INTERVENTIONS - DUE OR DUE SOON  Health Maintenance Due   Topic Date Due    Hepatitis C Screening  Never done    COVID-19 Vaccine (2 - Pfizer 3-dose series) 08/27/2021    Influenza Vaccine (1) 09/01/2021       PCP (Primary Care Provider)  Rip identifies his PCP as Jhon Lea MD.    DIAGNOSES  The primary encounter diagnosis was Preventative health care. Diagnoses of History of melanoma, Bilateral hearing loss, unspecified hearing loss type, and Class 1 obesity with body mass index (BMI) of 31.0 to 31.9 were also pertinent to this visit.    Problem List Items Addressed This Visit     Bilateral hearing loss (Chronic)    Overview     12/29/2021 Otoscopy Results reveal a mild-to-severe hearing loss 7677-6997 Hz for the right ear, and mild-to-severe hearing loss 9281-6187 Hz for the left ear.         Class 1 obesity with body mass  "index (BMI) of 31.0 to 31.9    Current Assessment & Plan     Wt Readings from Last 6 Encounters:   12/29/21 98 kg (216 lb 0.8 oz)   12/31/20 96.3 kg (212 lb 4.9 oz)   11/13/19 93 kg (205 lb 0.4 oz)   11/27/18 93 kg (205 lb 0.4 oz)   11/27/18 93 kg (205 lb 0.4 oz)   02/01/17 90.7 kg (200 lb)     Estimated body mass index is 31 kg/m² as calculated from the following:    Height as of this encounter: 5' 10" (1.778 m).    Weight as of this encounter: 98 kg (216 lb 0.8 oz).           History of melanoma    Current Assessment & Plan     I recommend that you schedule an appointment with a dermatologist soon for head-to-toe skin cancer screening.           Other Visit Diagnoses     Preventative health care    -  Primary        PHYSICAL EXAM  Vitals:    12/29/21 1100   BP: 126/70   BP Location: Left arm   Patient Position: Sitting   BP Method: Large (Automatic)   Pulse: 71   Temp: 98.8 °F (37.1 °C)   TempSrc: Tympanic   SpO2: 98%   Weight: 98 kg (216 lb 0.8 oz)   Height: 5' 10" (1.778 m)   CONSTITUTIONAL: No apparent distress. Normal appearance.   EYES: No scleral icterus. Conjunctivae unremarkable, not icteric.  NECK: No thyroid mass, thyromegaly or thyroid tenderness. No carotid bruit.   CARDIOVASCULAR: Normal rate and regular rhythm. Normal heart sounds.   PULMONARY: Pulmonary effort is normal. No respiratory distress. Normal breath sounds. No wheezing or rhonchi.   ABDOMINAL: Bowel sounds are normal. There is no distension. Abdomen is soft. There is no mass. There is no abdominal tenderness.   SKIN: Skin is warm and dry. Skin is not jaundiced.   NEUROLOGICAL: No focal deficit apparent. Alert, oriented to person, place, and time.   PSYCHIATRIC: Mood normal. Behavior: Behavior normal. Judgment normal.     DATA SUMMARY    LABORATORY:    CBC: The complete blood count (CBC) checks for anemia and measures the red blood cells, white blood cells, and platelets in your blood.  o YOUR RESULTS: NORMAL. No further evaluation or " "treatment required.     CMP: The comprehensive metabolic panel (CMP) checks kidney function, liver function, sodium, potassium, glucose (sugar) and other aspects of your metabolism.  o YOUR RESULTS: NORMAL or at least ACCEPTABLE and do not require further evaluation or treatment at this point.     Lipid Panel: The lipid panel measures the levels of different types of fatty substances in your blood (cholesterol and triglycerides) that can contribute to plaque buildup in your arteries (atherosclerosis).  o YOUR RESULTS: NORMAL. No further evaluation or treatment required.     A1c: The hemoglobin A1c (A1c) is a test that evaluates and screens for diabetes.  o YOUR RESULTS: NORMAL. No further evaluation or treatment required.     Urinalysis: A test that examines urine. The test can be chemical, microscopic, or both.  o YOUR RESULTS: NORMAL or at least ACCEPTABLE and do not require further evaluation or treatment at this point.     Serum Lead Level:  A measure of the amount of lead (a toxic heavy metal) in your blood.  o YOUR RESULTS: NORMAL. No further evaluation or treatment required.     TSH: The thyroid is a gland in the neck that helps regulate metabolism. The thyroid stimulating hormone (TSH) is a measure of your thyroid activity.  o YOUR RESULTS: NORMAL. No further evaluation or treatment required.    Want to learn more about your lab results and what they mean?  (1) Log in to your [facility] account at [email or web address]  (2) From the "View test results" tab, click on the test you want to know more about.  (3) Click on the "About This Test" link.     OTHER:   Audiometry: Audiometry is a test that measures a person's ability to hear at various sound frequencies.  o YOUR RESULTS: Results reveal a mild-to-severe hearing loss 6133-9906 Hz for the right ear, and mild-to-severe hearing loss 6342-2600 Hz for the left ear. Otoscopy was unremarkable. Recommendations include: 1. Complete Audiological Evaluation. " "2. ENT follow-up. 3. Possible Hearing aid consult pending candidacy. 4. Wear hearing protective devices around loud noise. 5. Annual audiograms.       MEDICATIONS  Outpatient Medications Prior to Visit   Medication Sig Dispense Refill    dextroamphetamine-amphetamine 10 mg Tab Take 1 pill bid      ibuprofen (ADVIL,MOTRIN) 800 MG tablet TAKE 1 PILL TWICE A DAY AFTER MEALS      sertraline (ZOLOFT) 50 MG tablet Take 50 mg by mouth.       No facility-administered medications prior to visit.   There are no discontinued medications.   FOLLOW-UP  Rip is to follow up with their PCP for routine Health Maintenance, any unresolved issues, and any new complaints or concerns.    PAST MEDICAL HISTORY  Rip has a past medical history of ADHD (attention deficit hyperactivity disorder), Anxiety, History of melanoma, and Lumbar pain with radiation down left leg.    SURGICAL HISTORY  Rip has a past surgical history that includes Skin cancer excision () and Appendectomy ().    FAMILY HISTORY  Rip family history includes Diabetes in his father; Heart disease in his father; Hypertension in his father; Seizures in his mother.     ALLERGIES  Review of patient's allergies indicates:  No Known Allergies    SOCIAL HISTORY  Rip  reports that he has never smoked. He has never used smokeless tobacco. He reports that he does not drink alcohol and does not use drugs.     Documentation entered by me for this encounter may have been done in part using speech-recognition technology. Although I have made an effort to ensure accuracy, "sound like" errors may exist and should be interpreted in context.       Lab Test Results for Rip Lopez,  1980    Component Date Value Ref Range    Sodium 2021 140  136 - 145 mmol/L    Potassium 2021 4.1  3.5 - 5.1 mmol/L    Chloride 2021 108  95 - 110 mmol/L    CO2 2021 23  23 - 29 mmol/L    Glucose 2021 96  70 - 110 mg/dL    BUN 2021 20  6 - 20 " mg/dL    Creatinine 12/29/2021 0.9  0.5 - 1.4 mg/dL    Calcium 12/29/2021 9.3  8.7 - 10.5 mg/dL    Total Protein 12/29/2021 7.4  6.0 - 8.4 g/dL    Albumin 12/29/2021 4.2  3.5 - 5.2 g/dL    Total Bilirubin 12/29/2021 1.1* 0.1 - 1.0 mg/dL    Alkaline Phosphatase 12/29/2021 41* 55 - 135 U/L    AST 12/29/2021 24  10 - 40 U/L    ALT 12/29/2021 42  10 - 44 U/L    Anion Gap 12/29/2021 9  8 - 16 mmol/L    eGFR if African American 12/29/2021 >60  >60 mL/min/1.73 m^2    eGFR if non African American 12/29/2021 >60  >60 mL/min/1.73 m^2    WBC 12/29/2021 4.85  3.90 - 12.70 K/uL    RBC 12/29/2021 4.77  4.60 - 6.20 M/uL    Hemoglobin 12/29/2021 14.8  14.0 - 18.0 g/dL    Hematocrit 12/29/2021 41.4  40.0 - 54.0 %    MCV 12/29/2021 87  82 - 98 fL    MCH 12/29/2021 31.0  27.0 - 31.0 pg    MCHC 12/29/2021 35.7  32.0 - 36.0 g/dL    RDW 12/29/2021 12.6  11.5 - 14.5 %    Platelets 12/29/2021 249  150 - 450 K/uL    MPV 12/29/2021 9.3  9.2 - 12.9 fL    Cholesterol 12/29/2021 166  120 - 199 mg/dL    Triglycerides 12/29/2021 89  30 - 150 mg/dL    HDL 12/29/2021 43  40 - 75 mg/dL    LDL Cholesterol 12/29/2021 105.2  63.0 - 159.0 mg/dL    HDL/Cholesterol Ratio 12/29/2021 25.9  20.0 - 50.0 %    Total Cholesterol/HDL Ratio 12/29/2021 3.9  2.0 - 5.0    Non-HDL Cholesterol 12/29/2021 123  mg/dL    TSH 12/29/2021 1.061  0.400 - 4.000 uIU/mL    Hemoglobin A1C 12/29/2021 4.7  4.0 - 5.6 %    Estimated Avg Glucose 12/29/2021 88  68 - 131 mg/dL    Lead, Blood, , Venous 12/29/2021 <1.0  <5.0 mcg/dL    Color, UA, Urine, Clean Catch 12/29/2021 Yellow  Yellow, Straw, [place]    Appearance, UA 12/29/2021 Clear  Clear    pH, UA 12/29/2021 6.0  5.0 - 8.0    Specific Gravity, UA 12/29/2021 >=1.030* 1.005 - 1.030    Protein, UA 12/29/2021 Negative  Negative    Glucose, UA 12/29/2021 Negative  Negative    Ketones, UA 12/29/2021 Negative  Negative    Bilirubin (UA) 12/29/2021 Negative  Negative    Occult Blood UA 12/29/2021  Negative  Negative    Nitrite, UA 2021 Negative  Negative    Leukocytes, UA 2021 Negative  Negative           Audiometry Results for Rip Lopez,  1980      Results reveal a mild-to-severe hearing loss 6057-0289 Hz for the right ear, and mild-to-severe hearing loss 8132-6795 Hz for the left ear. Otoscopy was unremarkable.   Recommendations include: 1. Complete Audiological Evaluation. 2. ENT follow-up. 3. Possible Hearing aid consult pending candidacy. 4. Wear hearing protective devices around loud noise. 5. Annual audiograms.

## 2021-12-29 NOTE — PATIENT INSTRUCTIONS
The COVID-19 vaccinations are doing an excellent job preventing hospitalization and death from COVID-19. I strongly recommend you get it. You can learn about the safety and effectiveness of the vaccine at [email or web address]. Afterward, let me know if you have any questions or concerns. I'll be glad to help.     The quickest and easiest way to schedule an appointment for your COVID-19 vaccination is by calling [phone].    Please take care of yourself. You are important to me.

## 2022-01-03 LAB
LEAD BLD-MCNC: <1 MCG/DL
SPECIMEN SOURCE: NORMAL
STATE OF RESIDENCE: NORMAL

## 2022-01-09 PROBLEM — H91.93 BILATERAL HEARING LOSS: Chronic | Status: ACTIVE | Noted: 2019-11-13

## 2022-01-09 PROBLEM — E66.811 CLASS 1 OBESITY DUE TO EXCESS CALORIES WITHOUT SERIOUS COMORBIDITY WITH BODY MASS INDEX (BMI) OF 31.0 TO 31.9 IN ADULT: Status: ACTIVE | Noted: 2022-01-09

## 2022-01-09 PROBLEM — E66.09 CLASS 1 OBESITY DUE TO EXCESS CALORIES WITHOUT SERIOUS COMORBIDITY WITH BODY MASS INDEX (BMI) OF 31.0 TO 31.9 IN ADULT: Status: ACTIVE | Noted: 2022-01-09

## 2022-01-09 NOTE — ASSESSMENT & PLAN NOTE
"Wt Readings from Last 6 Encounters:   12/29/21 98 kg (216 lb 0.8 oz)   12/31/20 96.3 kg (212 lb 4.9 oz)   11/13/19 93 kg (205 lb 0.4 oz)   11/27/18 93 kg (205 lb 0.4 oz)   11/27/18 93 kg (205 lb 0.4 oz)   02/01/17 90.7 kg (200 lb)     Estimated body mass index is 31 kg/m² as calculated from the following:    Height as of this encounter: 5' 10" (1.778 m).    Weight as of this encounter: 98 kg (216 lb 0.8 oz).    "

## 2022-12-05 ENCOUNTER — OFFICE VISIT (OUTPATIENT)
Dept: INTERNAL MEDICINE | Facility: CLINIC | Age: 42
End: 2022-12-05

## 2022-12-05 ENCOUNTER — CLINICAL SUPPORT (OUTPATIENT)
Dept: INTERNAL MEDICINE | Facility: CLINIC | Age: 42
End: 2022-12-05

## 2022-12-05 ENCOUNTER — CLINICAL SUPPORT (OUTPATIENT)
Dept: AUDIOLOGY | Facility: CLINIC | Age: 42
End: 2022-12-05

## 2022-12-05 VITALS
WEIGHT: 211 LBS | DIASTOLIC BLOOD PRESSURE: 67 MMHG | HEART RATE: 61 BPM | BODY MASS INDEX: 30.21 KG/M2 | TEMPERATURE: 98 F | SYSTOLIC BLOOD PRESSURE: 127 MMHG | HEIGHT: 70 IN

## 2022-12-05 DIAGNOSIS — E66.9 OBESITY (BMI 30.0-34.9): Chronic | ICD-10-CM

## 2022-12-05 DIAGNOSIS — Z71.3 DIETARY COUNSELING: Primary | ICD-10-CM

## 2022-12-05 DIAGNOSIS — Z85.820 HISTORY OF MELANOMA: Chronic | ICD-10-CM

## 2022-12-05 DIAGNOSIS — F33.1 MODERATE EPISODE OF RECURRENT MAJOR DEPRESSIVE DISORDER: ICD-10-CM

## 2022-12-05 DIAGNOSIS — Z01.12 HEARING CONSERVATION AND TREATMENT EXAM: Primary | ICD-10-CM

## 2022-12-05 DIAGNOSIS — H91.93 BILATERAL HEARING LOSS, UNSPECIFIED HEARING LOSS TYPE: Chronic | ICD-10-CM

## 2022-12-05 DIAGNOSIS — F90.9 ATTENTION DEFICIT HYPERACTIVITY DISORDER (ADHD), UNSPECIFIED ADHD TYPE: Chronic | ICD-10-CM

## 2022-12-05 DIAGNOSIS — M51.37 DDD (DEGENERATIVE DISC DISEASE), LUMBOSACRAL: Chronic | ICD-10-CM

## 2022-12-05 DIAGNOSIS — Z00.00 ROUTINE GENERAL MEDICAL EXAMINATION AT A HEALTH CARE FACILITY: Primary | ICD-10-CM

## 2022-12-05 PROBLEM — M51.379 DDD (DEGENERATIVE DISC DISEASE), LUMBOSACRAL: Status: ACTIVE | Noted: 2020-12-02

## 2022-12-05 PROBLEM — F41.9 ANXIETY: Status: ACTIVE | Noted: 2022-12-05

## 2022-12-05 PROBLEM — F41.9 ANXIETY: Chronic | Status: ACTIVE | Noted: 2022-12-05

## 2022-12-05 PROBLEM — E66.811 OBESITY (BMI 30.0-34.9): Chronic | Status: ACTIVE | Noted: 2022-01-09

## 2022-12-05 PROBLEM — M51.379 DDD (DEGENERATIVE DISC DISEASE), LUMBOSACRAL: Chronic | Status: ACTIVE | Noted: 2020-12-02

## 2022-12-05 LAB
ALBUMIN SERPL BCP-MCNC: 4.4 G/DL (ref 3.5–5.2)
ALP SERPL-CCNC: 40 U/L (ref 55–135)
ALT SERPL W/O P-5'-P-CCNC: 30 U/L (ref 10–44)
ANION GAP SERPL CALC-SCNC: 9 MMOL/L (ref 8–16)
AST SERPL-CCNC: 22 U/L (ref 10–40)
BILIRUB SERPL-MCNC: 0.8 MG/DL (ref 0.1–1)
BILIRUB UR QL STRIP: NEGATIVE
BUN SERPL-MCNC: 18 MG/DL (ref 6–20)
CALCIUM SERPL-MCNC: 9.7 MG/DL (ref 8.7–10.5)
CHLORIDE SERPL-SCNC: 105 MMOL/L (ref 95–110)
CHOLEST SERPL-MCNC: 151 MG/DL (ref 120–199)
CHOLEST/HDLC SERPL: 3.6 {RATIO} (ref 2–5)
CLARITY UR: CLEAR
CO2 SERPL-SCNC: 23 MMOL/L (ref 23–29)
COLOR UR: YELLOW
CREAT SERPL-MCNC: 1 MG/DL (ref 0.5–1.4)
ERYTHROCYTE [DISTWIDTH] IN BLOOD BY AUTOMATED COUNT: 12.4 % (ref 11.5–14.5)
EST. GFR  (NO RACE VARIABLE): >60 ML/MIN/1.73 M^2
ESTIMATED AVG GLUCOSE: 91 MG/DL (ref 68–131)
GLUCOSE SERPL-MCNC: 101 MG/DL (ref 70–110)
GLUCOSE UR QL STRIP: NEGATIVE
HBA1C MFR BLD: 4.8 % (ref 4–5.6)
HCT VFR BLD AUTO: 40 % (ref 40–54)
HDLC SERPL-MCNC: 42 MG/DL (ref 40–75)
HDLC SERPL: 27.8 % (ref 20–50)
HGB BLD-MCNC: 14.4 G/DL (ref 14–18)
HGB UR QL STRIP: NEGATIVE
KETONES UR QL STRIP: NEGATIVE
LDLC SERPL CALC-MCNC: 82.8 MG/DL (ref 63–159)
LEUKOCYTE ESTERASE UR QL STRIP: NEGATIVE
MCH RBC QN AUTO: 31 PG (ref 27–31)
MCHC RBC AUTO-ENTMCNC: 36 G/DL (ref 32–36)
MCV RBC AUTO: 86 FL (ref 82–98)
NITRITE UR QL STRIP: NEGATIVE
NONHDLC SERPL-MCNC: 109 MG/DL
PH UR STRIP: 6 [PH] (ref 5–8)
PLATELET # BLD AUTO: 265 K/UL (ref 150–450)
PMV BLD AUTO: 9.5 FL (ref 9.2–12.9)
POTASSIUM SERPL-SCNC: 4.7 MMOL/L (ref 3.5–5.1)
PROT SERPL-MCNC: 7.3 G/DL (ref 6–8.4)
PROT UR QL STRIP: NEGATIVE
RBC # BLD AUTO: 4.64 M/UL (ref 4.6–6.2)
SODIUM SERPL-SCNC: 137 MMOL/L (ref 136–145)
SP GR UR STRIP: 1.02 (ref 1–1.03)
TRIGL SERPL-MCNC: 131 MG/DL (ref 30–150)
TSH SERPL DL<=0.005 MIU/L-ACNC: 1.11 UIU/ML (ref 0.4–4)
URN SPEC COLLECT METH UR: NORMAL
WBC # BLD AUTO: 5.34 K/UL (ref 3.9–12.7)

## 2022-12-05 PROCEDURE — 99396 PREV VISIT EST AGE 40-64: CPT | Mod: S$PBB,,, | Performed by: FAMILY MEDICINE

## 2022-12-05 PROCEDURE — 84443 ASSAY THYROID STIM HORMONE: CPT | Performed by: FAMILY MEDICINE

## 2022-12-05 PROCEDURE — 99999 PR PBB SHADOW E&M-EST. PATIENT-LVL I: CPT | Mod: PBBFAC,,,

## 2022-12-05 PROCEDURE — 99999 PR PBB SHADOW E&M-EST. PATIENT-LVL III: CPT | Mod: PBBFAC,,, | Performed by: FAMILY MEDICINE

## 2022-12-05 PROCEDURE — 80053 COMPREHEN METABOLIC PANEL: CPT | Performed by: FAMILY MEDICINE

## 2022-12-05 PROCEDURE — 97802 MEDICAL NUTRITION INDIV IN: CPT | Mod: S$GLB,,, | Performed by: INTERNAL MEDICINE

## 2022-12-05 PROCEDURE — 99211 OFF/OP EST MAY X REQ PHY/QHP: CPT | Mod: PBBFAC,27

## 2022-12-05 PROCEDURE — 97802 PR MED NUTR THER, 1ST, INDIV, EA 15 MIN: ICD-10-PCS | Mod: S$GLB,,, | Performed by: INTERNAL MEDICINE

## 2022-12-05 PROCEDURE — 99999 PR PBB SHADOW E&M-EST. PATIENT-LVL III: ICD-10-PCS | Mod: PBBFAC,,, | Performed by: FAMILY MEDICINE

## 2022-12-05 PROCEDURE — 83655 ASSAY OF LEAD: CPT | Performed by: FAMILY MEDICINE

## 2022-12-05 PROCEDURE — 83036 HEMOGLOBIN GLYCOSYLATED A1C: CPT | Performed by: FAMILY MEDICINE

## 2022-12-05 PROCEDURE — 81003 URINALYSIS AUTO W/O SCOPE: CPT | Performed by: FAMILY MEDICINE

## 2022-12-05 PROCEDURE — 80061 LIPID PANEL: CPT | Performed by: FAMILY MEDICINE

## 2022-12-05 PROCEDURE — 99396 PR PREVENTIVE VISIT,EST,40-64: ICD-10-PCS | Mod: S$PBB,,, | Performed by: FAMILY MEDICINE

## 2022-12-05 PROCEDURE — 92552 PURE TONE AUDIOMETRY AIR: CPT | Mod: PBBFAC

## 2022-12-05 PROCEDURE — 85027 COMPLETE CBC AUTOMATED: CPT | Performed by: FAMILY MEDICINE

## 2022-12-05 PROCEDURE — 99213 OFFICE O/P EST LOW 20 MIN: CPT | Mod: PBBFAC | Performed by: FAMILY MEDICINE

## 2022-12-05 PROCEDURE — 99999 PR PBB SHADOW E&M-EST. PATIENT-LVL I: ICD-10-PCS | Mod: PBBFAC,,,

## 2022-12-05 NOTE — PROGRESS NOTES
Subjective:   Patient ID: Rip Lopez is a 42 y.o. male.  Chief Complaint:  Executive Health    Presents for executive Health evaluation #8   PCP Dr Jhon Lea  Medical history for melanoma, MDD, ADD lumbar degenerative disc disease, bilateral hearing loss, and Obesity.  Surgical history for skin cancer excision and appendectomy and vasectomy  Current medications Zoloft 50 mg daily and Motrin 800 mg 3 times a day as needed and Adderall 10 mg BID  No known drug allergies  Social history employed by the [employer].  .  Three children. No tobacco, alcohol, illicit drug use.  Family history includes father, alive, diabetes, hypertension, heart disease, and ascites.  Mother, alive, seizures.  No known colon or prostate cancer.    Health maintenance with tetanus booster November 2018 reported flu vaccine September 2021 and one Covid vaccine  No specific complaints today.    Review of Systems   Constitutional:  Negative for chills, fatigue and fever.   HENT:  Negative for congestion, dental problem, ear discharge, ear pain, postnasal drip, rhinorrhea, sinus pressure, sinus pain, sore throat and trouble swallowing.    Eyes:  Negative for pain, redness and visual disturbance.   Respiratory:  Negative for cough, chest tightness, shortness of breath and wheezing.    Cardiovascular:  Negative for chest pain, palpitations and leg swelling.   Gastrointestinal:  Negative for abdominal pain, constipation, diarrhea, nausea and vomiting.   Endocrine: Negative for polydipsia, polyphagia and polyuria.   Genitourinary:  Negative for difficulty urinating, dysuria, flank pain, frequency, hematuria and urgency.   Musculoskeletal:  Negative for myalgias.   Skin:  Negative for rash.   Neurological:  Negative for dizziness, weakness, light-headedness and headaches.   Hematological:  Negative for adenopathy.   Psychiatric/Behavioral:  Negative for sleep disturbance. The patient is not nervous/anxious.   "    Objective:   /67   Pulse 61   Temp 97.6 °F (36.4 °C)   Ht 5' 10" (1.778 m)   Wt 95.7 kg (211 lb)   BMI 30.28 kg/m²     Physical Exam  Vitals and nursing note reviewed.   Constitutional:       Appearance: Normal appearance. He is well-developed. He is obese.   HENT:      Right Ear: Hearing, tympanic membrane, ear canal and external ear normal.      Left Ear: Hearing, tympanic membrane, ear canal and external ear normal.      Nose: No mucosal edema, congestion or rhinorrhea.      Right Turbinates: Not enlarged or swollen.      Left Turbinates: Not enlarged or swollen.      Mouth/Throat:      Mouth: No oral lesions.      Pharynx: Oropharynx is clear. Uvula midline.   Eyes:      General: No scleral icterus.        Right eye: No discharge.         Left eye: No discharge.      Conjunctiva/sclera: Conjunctivae normal.      Right eye: Right conjunctiva is not injected.      Left eye: Left conjunctiva is not injected.   Neck:      Thyroid: No thyroid mass, thyromegaly or thyroid tenderness.   Cardiovascular:      Rate and Rhythm: Normal rate and regular rhythm.      Heart sounds: Normal heart sounds. No murmur heard.    No friction rub. No gallop.   Pulmonary:      Effort: Pulmonary effort is normal. No accessory muscle usage or respiratory distress.      Breath sounds: Normal breath sounds.   Abdominal:      General: There is no distension.      Palpations: Abdomen is soft.      Tenderness: There is no abdominal tenderness.   Musculoskeletal:      Right lower leg: No edema.      Left lower leg: No edema.   Lymphadenopathy:      Cervical: No cervical adenopathy.   Skin:     Findings: No rash.   Neurological:      General: No focal deficit present.      Mental Status: He is alert and oriented to person, place, and time. Mental status is at baseline.      Coordination: Coordination is intact.      Gait: Gait is intact.   Psychiatric:         Mood and Affect: Mood and affect normal.     CBC with normal white " blood cell count, red blood cell count, and platelet levels.  Sugar, Kidney, Liver, and Electrolyte tests are all normal.  Total cholesterol 151.  Triglycerides 131.  HDL 42.  LDL 83.  Ten year cardiovascular risk 1%.    Urinalysis normal   TSH, A1c, lead level pending     Audiology exam pending     Assessment:       ICD-10-CM ICD-9-CM   1. Routine general medical examination at a health care facility  Z00.00 V70.0   2. Moderate episode of recurrent major depressive disorder  F33.1 296.32   3. Attention deficit hyperactivity disorder (ADHD), unspecified ADHD type  F90.9 314.01   4. DDD (degenerative disc disease), lumbosacral  M51.37 722.52   5. Bilateral hearing loss, unspecified hearing loss type  H91.93 389.9   6. History of melanoma  Z85.820 V10.82   7. Obesity (BMI 30.0-34.9)  E66.9 278.00     Plan:   Routine general medical examination at a health care facility  Blood pressure normal.  BMI 30.  Overall exam stable.    Send full executive Health letter when all test resulted.    Colon cancer screening at 44 years old  Tetanus booster up-to-date   Advised to complete primary COVID vaccine series   Declines/defers flu vaccine today      Moderate episode of recurrent major depressive disorder  Attention deficit hyperactivity disorder (ADHD), unspecified ADHD type  Stable.  Symptoms controlled.    Continue current medications     DDD (degenerative disc disease), lumbosacral  Stable.    Continue Motrin as needed     Bilateral hearing loss, unspecified hearing loss type  Stable.    Wear hearing protective devices around loud noises     History of melanoma  Reports up-to-date with annual dermatology screening     Obesity (BMI 30.0-34.9)  Discussed increased BMI, Increased health risks, Need for weight loss, Lifestyle modifications.  Specifically discussed goal to decrease weight by 10 lb and obtain body mass index less than 30 to reduce any increased health risk     Follow-up with PCP as planned/as scheduled      Return to clinic 1 year for executive Health evaluation

## 2022-12-05 NOTE — PROGRESS NOTES
Executive Health Screening    Rip Lopez was seen 12/05/2022 for an executive health hearing screen. Results revealed normal hearing sensitivity through 1500 Hz sloping to a moderately-severe hearing loss in the right ear and normal hearing sensitivity through 1000 Hz sloping to a mild to moderately severe hearing loss in the left ear.  Otoscopy was within normal limits, bilaterally.    Recommendations:  1. Wear Hearing Protective Devices around loud noises

## 2022-12-05 NOTE — PROGRESS NOTES
Nutrition Assessment  Session Time:        Client name:  Rip Lopez  :  1980  Age:  42 y.o.  Gender:  male    Client states:  [employer] employee present for annual physical.  Last physical was in 2021.  Reports gaining 10 lbs every 7-8 years.  Interested in maintaining a weight of 200 lbs but is more focused on eating better overall and focusing on food choices with less preservatives and additives.   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  Purchases meats and other foods from [employer].   Working to reduce coffee intake in last 2 weeks which has helped patient to avoid afternoon energy crashes and is experiencing overall improved energy.   Has water first thing in morning followed by 1 large cup of coffee 1 hour later.  Began taking Glucosamine and peptides for back after learning he has degenerative disc. Feels like improvement has occurred.     Reports needing to exercise more. Plans to begin a daily exercise routine with wife soon.   Described current exercise as Moderate.  Also needs to be more consistent with his nutrition.  Reports doing well with eliminating candy from his diet but when he decides to have some, it turns to excessive intake.     Dine out: Stays away from fast food mostly. Choosing better quality restaurants.     Alcohol: 6 pack beer every 1-2 weeks. Splash of whiskey in coffee. Wine 2-3 times per week.     Has 2 kids (ages 5 and 9) and wants to create healthy habits for them.       2021: Client states:    [employer] employee present for annual Executive Health physical.  Last physical was in 2020.  Reports wanting to lose weight, approximately 20 lbs.  Aware of changes that needs to be made. Needs to stop being lazy and make those changes.   Diet remains mostly the same as last physical. Consumes small meals/snacks throughout the day and large portions for dinner.   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 Patient should have more time to exercise at the station he is relocating to unlike current station which remains very busy.  No questions today.  Not seeking further guidance.         12-: Client states:  Very pleasant employee from [employer] (employed 6 years) here for his annual physical with [facility]. Reports being  and has 3 children. Does not exercise but remains active with ADLs. He reports struggling with portion control and recently, holiday candy.  He states that he frequently eats very little all day and then overeats at the evening meal, wine daily with dinner. Rarely dines out and tries to focus on protein and vegetables at supper.         Anthropometrics  Height:  70"     Weight:  211 lbs   12-: 216 lbs   12-: 212 lbs  BMI:  30.28  % Body Fat:  n/a    Clinical Signs/Symptoms  N/V/D:  none reported  Appetite:  good       Past Medical History:   Diagnosis Date    ADHD (attention deficit hyperactivity disorder)     Anxiety     History of melanoma 1991    Lumbar pain with radiation down left leg 12/2/2020       Past Surgical History:   Procedure Laterality Date    APPENDECTOMY  2000    SKIN CANCER EXCISION  1991       Medications    has a current medication list which includes the following prescription(s): dextroamphetamine-amphetamine, ibuprofen, and sertraline.    Vitamins, Minerals, and/or Supplements:  glucosamine, peptides     Food/Medication Interactions:  Reviewed     Food Allergies or Intolerances:  NKFA     Social History    Marital status:    Employment:  [employer]    Social History     Tobacco Use    Smoking status: Never    Smokeless tobacco: Never   Substance Use Topics    Alcohol use: No        Lab Reports   Sodium   Date Value Ref Range Status   12/29/2021 140 136 - 145 mmol/L Final     Potassium   Date Value Ref Range Status   12/29/2021 4.1 3.5 - 5.1 mmol/L Final     Chloride   Date Value Ref Range Status "   12/29/2021 108 95 - 110 mmol/L Final     CO2   Date Value Ref Range Status   12/29/2021 23 23 - 29 mmol/L Final     Glucose   Date Value Ref Range Status   12/29/2021 96 70 - 110 mg/dL Final     BUN   Date Value Ref Range Status   12/29/2021 20 6 - 20 mg/dL Final     Creatinine   Date Value Ref Range Status   12/29/2021 0.9 0.5 - 1.4 mg/dL Final     Calcium   Date Value Ref Range Status   12/29/2021 9.3 8.7 - 10.5 mg/dL Final     Total Protein   Date Value Ref Range Status   12/29/2021 7.4 6.0 - 8.4 g/dL Final     Albumin   Date Value Ref Range Status   12/29/2021 4.2 3.5 - 5.2 g/dL Final     Total Bilirubin   Date Value Ref Range Status   12/29/2021 1.1 (H) 0.1 - 1.0 mg/dL Final     Comment:     For infants and newborns, interpretation of results should be based  on gestational age, weight and in agreement with clinical  observations.    Premature Infant recommended reference ranges:  Up to 24 hours.............<8.0 mg/dL  Up to 48 hours............<12.0 mg/dL  3-5 days..................<15.0 mg/dL  6-29 days.................<15.0 mg/dL       Alkaline Phosphatase   Date Value Ref Range Status   12/29/2021 41 (L) 55 - 135 U/L Final     AST   Date Value Ref Range Status   12/29/2021 24 10 - 40 U/L Final     ALT   Date Value Ref Range Status   12/29/2021 42 10 - 44 U/L Final     Anion Gap   Date Value Ref Range Status   12/29/2021 9 8 - 16 mmol/L Final     eGFR if    Date Value Ref Range Status   12/29/2021 >60 >60 mL/min/1.73 m^2 Final     eGFR if non    Date Value Ref Range Status   12/29/2021 >60 >60 mL/min/1.73 m^2 Final     Comment:     Calculation used to obtain the estimated glomerular filtration  rate (eGFR) is the CKD-EPI equation.         Lab Results   Component Value Date    WBC 4.85 12/29/2021    HGB 14.8 12/29/2021    HCT 41.4 12/29/2021    MCV 87 12/29/2021     12/29/2021        Lab Results   Component Value Date    CHOL 166 12/29/2021     Lab Results   Component  Value Date    HDL 43 12/29/2021     Lab Results   Component Value Date    LDLCALC 105.2 12/29/2021     Lab Results   Component Value Date    TRIG 89 12/29/2021     Lab Results   Component Value Date    CHOLHDL 25.9 12/29/2021     Lab Results   Component Value Date    HGBA1C 4.7 12/29/2021     BP Readings from Last 1 Encounters:   12/29/21 126/70       Food History  No recall.  Food information provided above    Exercise History:  provided above    Cultural/Spiritual/Personal Preferences:  None identified    Support System:  spouse    State of Change:  Preparation    Barriers to Change:  none    Diagnosis    obesity related to excess energy intake as evidenced by BMI 30.    Intervention    RMR (Method:  [place]. Jeor):  1863 kcal  Activity Factor:  1.3    BRIELLE:  2421 - 500 = 1921 kcal    Goals:  1.  Increase exercise.  2.  Decrease alcohol frequency.  3.  Increase intake of non-starchy vegetables.        Nutrition Education  The following education was provided to the patient:  Discussed weight management.  Suggested dietary modifications based on current dietary behaviors and individual food preferences.  *Lab results were pending at time of consult and so, not discussed with patient.  Discussed not restricting self completely from candy/treat to help prevent binging episodes.     Patient verbalized understanding of nutrition education and recommendations received.    Handouts Provided  none    Monitoring/Evaluation    Monitor the following:  Weight  BMI  Caloric intake  Labs:  lipid panel, A1c    Follow Up Plan:  Communication with referring healthcare provider is unnecessary at this time as patient presented as part of annual wellness exam.  However, will follow up with patient in 1-2 years.

## 2022-12-05 NOTE — LETTER
December 5, 2022    Rip Lopez  [address]  [place] [address]             The [address]  [address] [facility] [address]  Phone: [phone]  Fax: [phone] Dear Mr. Lopez,    On December 5, 2022, it was a pleasure to see you again and perform your eighth [facility] evaluation. At the time of this visit, you are 42 years old. You denied any specific complaints or concerns during todays visit.      YOUR PAST MEDICAL HISTORY includes melanoma, depression, ADD, lumbar degenerative disc disease, hearing loss, and obesity.    YOUR PAST SURGICAL HISTORY includes a vasectomy, a skin cancer excision, and an appendectomy.    YOUR CURRENT MEDICATIONS include Zoloft 50 mg daily, Adderall 10 mg twice a day, and Motrin 800 mg 3 times a day as needed.    You do not have ANY KNOWN DRUG ALLERGIES.    YOUR SOCIAL HISTORY includes employment by the [employer]. You are  with three children. You denied any tobacco, alcohol, or illicit drug use.    YOUR FAMILY HISTORY includes your father, alive, with diabetes, hypertension, heart disease, and ascites. Your mother, alive, with epilipsy. No known colon or prostate cancer.    YOUR ROUTINE HEALTH MAINTENANCE includes a tetanus booster in November 2018, reported flu vaccine in September 2021, and initial COVID vaccine which did not complete primary vaccine series.    PHYSICAL EXAMINATION: You are 5 ft 10 in tall, weighing 212 pounds with a body mass index of 30.2. This places you in the Obese category. Your blood pressure is 127/67. Your heart rate is 61 beats per minute. All of these are normal values. Your physical examination did not reveal any significant abnormal findings.    YOUR BLOOD WORK AND ADDITIONAL TESTS: Reveal normal white cell counts, red cell counts, and platelet levels. You are not Anemic. Your glucose, kidney, liver, and electrolyte tests are normal. Your total cholesterol is  151. Your triglycerides are 131. Your HDL is 42. Your LDL is 83.  Based on these numbers, your 10-year risk of heart attack or stroke is 1%. Your hemoglobin A1c is 4.8%, which is in a normal range. You do not have Prediabetes   or Diabetes. Your TSH is 1.115 , which is in a normal range. You do not have an activeThyroid problem.  Your urinalysis is normal. Your serum lead level is < 1.0, which is in a normal range.    Your audiology exam revealed normal hearing sensitivity through 1500 Hz sloping to a moderately-severe hearing loss in the right ear and normal hearing sensitivity through 1000 Hz sloping to a mild to moderately severe hearing loss in the left ear.    In summary, you are overall in very good health. You are Obeset. Lifestyle modifications to promote weight loss, like regular physical activity and decreased caloric intake, are encouraged. Your 10-year risk of heart attack or stroke is low. Daily aspirin or cholesterol medications are not recommended. Since your labs are normal, you may safely continue all your current medications.    Based on the [place] Preventive Task Force recommendations, you should receive yearly Influenza vaccinations. You should have a Tetanus booster every 10 years. You should complete the primary COVID vaccine series. You should start screening for Colon cancer at 44 years old.    It was a pleasure to see you again and perform this Executive Health evaluation. If I can be of any further assistance, or if you have any additional questions or concerns, please do not hesitate to let me know.    Sincerely,    Ayo Diez MD, FAAFP

## 2022-12-06 LAB
LEAD BLD-MCNC: <1 MCG/DL
SPECIMEN SOURCE: NORMAL
STATE OF RESIDENCE: NORMAL

## 2023-11-16 DIAGNOSIS — Z00.00 ROUTINE GENERAL MEDICAL EXAMINATION AT A HEALTH CARE FACILITY: Primary | ICD-10-CM

## 2023-11-30 DIAGNOSIS — Z00.00 ROUTINE GENERAL MEDICAL EXAMINATION AT A HEALTH CARE FACILITY: Primary | ICD-10-CM

## 2023-12-18 ENCOUNTER — CLINICAL SUPPORT (OUTPATIENT)
Dept: INTERNAL MEDICINE | Facility: CLINIC | Age: 43
End: 2023-12-18

## 2023-12-18 ENCOUNTER — TELEPHONE (OUTPATIENT)
Dept: INTERNAL MEDICINE | Facility: CLINIC | Age: 43
End: 2023-12-18

## 2023-12-18 ENCOUNTER — OFFICE VISIT (OUTPATIENT)
Dept: INTERNAL MEDICINE | Facility: CLINIC | Age: 43
End: 2023-12-18

## 2023-12-18 ENCOUNTER — CLINICAL SUPPORT (OUTPATIENT)
Dept: AUDIOLOGY | Facility: CLINIC | Age: 43
End: 2023-12-18

## 2023-12-18 ENCOUNTER — HOSPITAL ENCOUNTER (OUTPATIENT)
Dept: CARDIOLOGY | Facility: HOSPITAL | Age: 43
Discharge: HOME | End: 2023-12-18

## 2023-12-18 ENCOUNTER — CLINICAL SUPPORT (OUTPATIENT)
Dept: PULMONOLOGY | Facility: CLINIC | Age: 43
End: 2023-12-18

## 2023-12-18 VITALS
BODY MASS INDEX: 31.78 KG/M2 | SYSTOLIC BLOOD PRESSURE: 118 MMHG | WEIGHT: 222 LBS | DIASTOLIC BLOOD PRESSURE: 71 MMHG | TEMPERATURE: 98 F | HEIGHT: 70 IN | HEART RATE: 62 BPM

## 2023-12-18 DIAGNOSIS — Z00.00 ROUTINE GENERAL MEDICAL EXAMINATION AT A HEALTH CARE FACILITY: ICD-10-CM

## 2023-12-18 DIAGNOSIS — Z00.00 ROUTINE GENERAL MEDICAL EXAMINATION AT A HEALTH CARE FACILITY: Primary | ICD-10-CM

## 2023-12-18 DIAGNOSIS — Z01.12 HEARING CONSERVATION AND TREATMENT EXAM: Primary | ICD-10-CM

## 2023-12-18 DIAGNOSIS — E11.9 TYPE 2 DIABETES MELLITUS WITHOUT COMPLICATION, WITHOUT LONG-TERM CURRENT USE OF INSULIN: ICD-10-CM

## 2023-12-18 LAB
ALBUMIN SERPL BCP-MCNC: 4.3 G/DL (ref 3.5–5.2)
ALP SERPL-CCNC: 37 U/L (ref 55–135)
ALT SERPL W/O P-5'-P-CCNC: 44 U/L (ref 10–44)
ANION GAP SERPL CALC-SCNC: 9 MMOL/L (ref 8–16)
AST SERPL-CCNC: 27 U/L (ref 10–40)
BILIRUB SERPL-MCNC: 0.4 MG/DL (ref 0.1–1)
BILIRUB UR QL STRIP: NEGATIVE
BUN SERPL-MCNC: 17 MG/DL (ref 6–20)
CALCIUM SERPL-MCNC: 9.2 MG/DL (ref 8.7–10.5)
CHLORIDE SERPL-SCNC: 107 MMOL/L (ref 95–110)
CHOLEST SERPL-MCNC: 156 MG/DL (ref 120–199)
CHOLEST/HDLC SERPL: 3.8 {RATIO} (ref 2–5)
CLARITY UR: CLEAR
CO2 SERPL-SCNC: 25 MMOL/L (ref 23–29)
COLOR UR: YELLOW
CREAT SERPL-MCNC: 1 MG/DL (ref 0.5–1.4)
ERYTHROCYTE [DISTWIDTH] IN BLOOD BY AUTOMATED COUNT: 12.5 % (ref 11.5–14.5)
EST. GFR  (NO RACE VARIABLE): >60 ML/MIN/1.73 M^2
ESTIMATED AVG GLUCOSE: 148 MG/DL (ref 68–131)
GLUCOSE SERPL-MCNC: 99 MG/DL (ref 70–110)
GLUCOSE UR QL STRIP: NEGATIVE
HBA1C MFR BLD: 6.8 % (ref 4–5.6)
HCT VFR BLD AUTO: 40.4 % (ref 40–54)
HDLC SERPL-MCNC: 41 MG/DL (ref 40–75)
HDLC SERPL: 26.3 % (ref 20–50)
HGB BLD-MCNC: 14.5 G/DL (ref 14–18)
HGB UR QL STRIP: NEGATIVE
KETONES UR QL STRIP: NEGATIVE
LDLC SERPL CALC-MCNC: 89.8 MG/DL (ref 63–159)
LEUKOCYTE ESTERASE UR QL STRIP: NEGATIVE
MCH RBC QN AUTO: 30.9 PG (ref 27–31)
MCHC RBC AUTO-ENTMCNC: 35.9 G/DL (ref 32–36)
MCV RBC AUTO: 86 FL (ref 82–98)
NITRITE UR QL STRIP: NEGATIVE
NONHDLC SERPL-MCNC: 115 MG/DL
PH UR STRIP: 6 [PH] (ref 5–8)
PLATELET # BLD AUTO: 240 K/UL (ref 150–450)
PMV BLD AUTO: 9.5 FL (ref 9.2–12.9)
POTASSIUM SERPL-SCNC: 4.4 MMOL/L (ref 3.5–5.1)
PROT SERPL-MCNC: 7.4 G/DL (ref 6–8.4)
PROT UR QL STRIP: NEGATIVE
RBC # BLD AUTO: 4.69 M/UL (ref 4.6–6.2)
SODIUM SERPL-SCNC: 141 MMOL/L (ref 136–145)
SP GR UR STRIP: 1.02 (ref 1–1.03)
TRIGL SERPL-MCNC: 126 MG/DL (ref 30–150)
TSH SERPL DL<=0.005 MIU/L-ACNC: 1.35 UIU/ML (ref 0.4–4)
URN SPEC COLLECT METH UR: NORMAL
WBC # BLD AUTO: 4.57 K/UL (ref 3.9–12.7)

## 2023-12-18 PROCEDURE — 93010 ELECTROCARDIOGRAM REPORT: CPT | Mod: ,,, | Performed by: INTERNAL MEDICINE

## 2023-12-18 PROCEDURE — 99999 PR PBB SHADOW E&M-EST. PATIENT-LVL I: CPT | Mod: PBBFAC,,,

## 2023-12-18 PROCEDURE — 94010 BREATHING CAPACITY TEST: CPT | Mod: 26,S$PBB,, | Performed by: INTERNAL MEDICINE

## 2023-12-18 PROCEDURE — 83036 HEMOGLOBIN GLYCOSYLATED A1C: CPT | Performed by: FAMILY MEDICINE

## 2023-12-18 PROCEDURE — 99999PBSHW PR PBB SHADOW TECHNICAL ONLY FILED TO HB: Mod: PBBFAC,,,

## 2023-12-18 PROCEDURE — 97802 MEDICAL NUTRITION INDIV IN: CPT | Mod: S$GLB,,, | Performed by: DIETITIAN, REGISTERED

## 2023-12-18 PROCEDURE — 92552 PURE TONE AUDIOMETRY AIR: CPT | Mod: PBBFAC

## 2023-12-18 PROCEDURE — 84403 ASSAY OF TOTAL TESTOSTERONE: CPT | Performed by: FAMILY MEDICINE

## 2023-12-18 PROCEDURE — 99999PBSHW PR PBB SHADOW TECHNICAL ONLY FILED TO HB: ICD-10-PCS | Mod: PBBFAC,,,

## 2023-12-18 PROCEDURE — 80061 LIPID PANEL: CPT | Performed by: FAMILY MEDICINE

## 2023-12-18 PROCEDURE — 85027 COMPLETE CBC AUTOMATED: CPT | Performed by: FAMILY MEDICINE

## 2023-12-18 PROCEDURE — 99999 PR PBB SHADOW E&M-EST. PATIENT-LVL III: ICD-10-PCS | Mod: PBBFAC,,, | Performed by: FAMILY MEDICINE

## 2023-12-18 PROCEDURE — 99396 PREV VISIT EST AGE 40-64: CPT | Mod: S$PBB,,, | Performed by: FAMILY MEDICINE

## 2023-12-18 PROCEDURE — 99999 PR PBB SHADOW E&M-EST. PATIENT-LVL III: CPT | Mod: PBBFAC,,, | Performed by: FAMILY MEDICINE

## 2023-12-18 PROCEDURE — 99211 OFF/OP EST MAY X REQ PHY/QHP: CPT | Mod: PBBFAC

## 2023-12-18 PROCEDURE — 99396 PR PREVENTIVE VISIT,EST,40-64: ICD-10-PCS | Mod: S$PBB,,, | Performed by: FAMILY MEDICINE

## 2023-12-18 PROCEDURE — 93010 EKG 12-LEAD: ICD-10-PCS | Mod: ,,, | Performed by: INTERNAL MEDICINE

## 2023-12-18 PROCEDURE — 99211 OFF/OP EST MAY X REQ PHY/QHP: CPT | Mod: S$GLB,,, | Performed by: INTERNAL MEDICINE

## 2023-12-18 PROCEDURE — 93005 ELECTROCARDIOGRAM TRACING: CPT

## 2023-12-18 PROCEDURE — 99211 PR NURSE VISIT, 15 MINS, EXEC HLTH ONLY: ICD-10-PCS | Mod: S$GLB,,, | Performed by: INTERNAL MEDICINE

## 2023-12-18 PROCEDURE — 99213 OFFICE O/P EST LOW 20 MIN: CPT | Mod: PBBFAC,27 | Performed by: FAMILY MEDICINE

## 2023-12-18 PROCEDURE — 94010 BREATHING CAPACITY TEST: CPT | Mod: PBBFAC

## 2023-12-18 PROCEDURE — 80053 COMPREHEN METABOLIC PANEL: CPT | Performed by: FAMILY MEDICINE

## 2023-12-18 PROCEDURE — 99999 PR PBB SHADOW E&M-EST. PATIENT-LVL I: ICD-10-PCS | Mod: PBBFAC,,,

## 2023-12-18 PROCEDURE — 97802 PR MED NUTR THER, 1ST, INDIV, EA 15 MIN: ICD-10-PCS | Mod: S$GLB,,, | Performed by: DIETITIAN, REGISTERED

## 2023-12-18 PROCEDURE — 83655 ASSAY OF LEAD: CPT | Performed by: FAMILY MEDICINE

## 2023-12-18 PROCEDURE — 81003 URINALYSIS AUTO W/O SCOPE: CPT | Performed by: FAMILY MEDICINE

## 2023-12-18 PROCEDURE — 94010 BREATHING CAPACITY TEST: ICD-10-PCS | Mod: 26,S$PBB,, | Performed by: INTERNAL MEDICINE

## 2023-12-18 PROCEDURE — 84443 ASSAY THYROID STIM HORMONE: CPT | Performed by: FAMILY MEDICINE

## 2023-12-18 NOTE — PROGRESS NOTES
Executive Health Screening    Rip Lopez was seen 12/18/2023 for [facility].      Otoscopy was unremarkable in both ears. Results revealed normal hearing sensitivity through 1500 Hz sloping to a mild to moderately-severe hearing loss for the right ear and normal hearing sensitivity through 1500 Hz sloping to a mild to severe hearing loss for the left ear.      Patient was counseled on the above findings.    Recommendations:   Complete audiological evaluation with ENT consult.  Possible hearing aid consult pending candidacy.   Consistent use of hearing protective devices when around loud noise.   Annual hearing screenings.

## 2023-12-18 NOTE — PROGRESS NOTES
"Subjective:   Patient ID: Rip Lopez is a 43 y.o. male.  Chief Complaint:  Executive Health    Presents for executive Health evaluation   PCP Dr Jhon Lea  Medical history for melanoma, MDD, ADD lumbar degenerative disc disease, bilateral hearing loss, and Obesity.  Surgical history for skin cancer excision and appendectomy and vasectomy  Current medications Zoloft 50 mg daily and Motrin 800 mg 3 times a day as needed and Adderall 10 mg BID  No known drug allergies  Social history employed by the [employer].  .  Three children. No tobacco, alcohol, illicit drug use.  Family history includes father, alive, diabetes, hypertension, heart disease, and ascites.  Mother, alive, seizures.  No known colon or prostate cancer.    Health maintenance with tetanus booster November 2018 reported flu vaccine September 2022 and one Covid vaccine  No specific complaints today.        Objective:   /71   Pulse 62   Temp 97.5 °F (36.4 °C)   Ht 5' 10" (1.778 m)   Wt 100.7 kg (222 lb)   BMI 31.85 kg/m²     Physical Exam  Vitals and nursing note reviewed.   Constitutional:       Appearance: Normal appearance. He is well-developed. He is obese.   HENT:      Right Ear: Hearing, tympanic membrane, ear canal and external ear normal.      Left Ear: Hearing, tympanic membrane, ear canal and external ear normal.      Nose: No mucosal edema, congestion or rhinorrhea.      Right Turbinates: Not enlarged or swollen.      Left Turbinates: Not enlarged or swollen.      Mouth/Throat:      Mouth: No oral lesions.      Pharynx: Oropharynx is clear. Uvula midline.   Eyes:      General: No scleral icterus.        Right eye: No discharge.         Left eye: No discharge.      Conjunctiva/sclera: Conjunctivae normal.      Right eye: Right conjunctiva is not injected.      Left eye: Left conjunctiva is not injected.   Neck:      Thyroid: No thyroid mass, thyromegaly or thyroid tenderness.   Cardiovascular:      Rate " and Rhythm: Normal rate and regular rhythm.      Heart sounds: Normal heart sounds. No murmur heard.     No friction rub. No gallop.   Pulmonary:      Effort: Pulmonary effort is normal. No accessory muscle usage or respiratory distress.      Breath sounds: Normal breath sounds.   Abdominal:      General: There is no distension.      Palpations: Abdomen is soft.      Tenderness: There is no abdominal tenderness.   Musculoskeletal:      Right lower leg: No edema.      Left lower leg: No edema.   Lymphadenopathy:      Cervical: No cervical adenopathy.   Skin:     Findings: No rash.   Neurological:      General: No focal deficit present.      Mental Status: He is alert and oriented to person, place, and time. Mental status is at baseline.      Coordination: Coordination is intact.      Gait: Gait is intact.   Psychiatric:         Mood and Affect: Mood and affect normal.     CBC and CMP normal   Lipid panel with total cholesterol 156.  Triglycerides 126.  HDL 41.  LDL 90.  Ten year cardiovascular risk 1%.  A1c 6.8% and elevated in diabetic range.  New diagnosis.    TSH 1.349 and normal   Urinalysis normal  Testosterone level 611 normal   Lead level normal    Audiology exam results revealed normal hearing sensitivity through 1500 Hz sloping to a mild to moderately-severe hearing loss for the right ear and normal hearing sensitivity through 1500 Hz sloping to a mild to severe hearing loss for the left ear.    Complete audiological evaluation with ENT consult.  Possible hearing aid consult pending candidacy.   Consistent use of hearing protective devices when around loud noise.   Annual hearing screenings.     Pulmonary function testing revealed normal spirometry     EKG sinus rhythm with first-degree AV heart block and no significant changes when compared to previous tracings.    Assessment:       ICD-10-CM ICD-9-CM   1. Routine general medical examination at a health care facility  Z00.00 V70.0   2. Type 2 diabetes  mellitus without complication, without long-term current use of insulin  E11.9 250.00     Plan:   Routine general medical examination at a health care facility  -     TESTOSTERONE; Future; Expected date: 12/18/2023  Blood pressure normal.  BMI 32.  Overall exam unremarkable.    Will send executive Health letter with all test results and recommendations   Colon cancer screening at 44 years old   Tetanus booster up-to-date  Declines COVID booster  Declines flu vaccine     Type 2 diabetes mellitus without complication, without long-term current use of insulin  New onset/diagnosis with A1c now 6.8%   Needs to follow up with PCP for additional treatment and evaluation    Return to clinic 1 year for executive Health evaluation

## 2023-12-18 NOTE — LETTER
December 20, 2023    Rip Lopez  [address]  [place] [address]             [address]  [address] [facility] [place] [address]  Phone: [phone]  Fax: [phone] Dear Mr. Lopez,    On December 18, 2023, it was a pleasure to see you again and perform your Executive Health evaluation. At the time of this visit, you are 43 years old. You denied any specific complaints or concerns during todays visit.      YOUR PAST MEDICAL HISTORY includes melanoma, depression, ADD, lumbar degenerative disc disease, hearing loss, and obesity.    YOUR PAST SURGICAL HISTORY includes a vasectomy, a skin cancer excision, and an appendectomy.    YOUR CURRENT MEDICATIONS include Zoloft 50 mg daily, Adderall 10 mg twice a day, and Motrin 800 mg 3 times a day as needed.    You do not have ANY KNOWN DRUG ALLERGIES.    YOUR SOCIAL HISTORY includes employment by the [employer]. You are  with three children. You denied any tobacco, alcohol, or illicit drug use.    YOUR FAMILY HISTORY includes your father, alive, with diabetes, hypertension, heart disease, and ascites. Your mother, alive, with epilipsy. No known colon or prostate cancer.    YOUR ROUTINE HEALTH MAINTENANCE includes a tetanus booster in November 2018, reported flu vaccine in September 2022, and initial COVID vaccine but you did not the complete primary vaccine series.    PHYSICAL EXAMINATION: You are 5 ft 10 in tall, weighing 222 pounds with a body mass index of 32. You remain in the Obese category. Your blood [place] [address]/71 Your heart rate is 62 beats per minute. All of these are normal values. Your physical examination did not reveal any significant abnormal findings.    YOUR BLOOD WORK AND ADDITIONAL TESTS: Reveal normal white cell counts, red cell counts, and platelet levels. You are not Anemic. Your glucose, kidney, liver, and electrolyte tests are normal. Your total cholesterol is  156. Your triglycerides are 126. Your HDL is 41. Your LDL is 90.  Based on these numbers, your 10-year risk of heart attack or stroke is 1%. Your hemoglobin A1c is 6.8%, which is elevated in a diabetic range. Your TSH level is 1.349 , which is in a normal range. You do not have an active Thyroid problem.  Your urinalysis is normal. Your testosterone level is 6 1, which is in a normal range.  Your serum lead level is < normal.    Your audiology exam revealed normal hearing sensitivity through 1500 Hz sloping to a moderately-severe hearing loss in the right ear and normal hearing sensitivity through 1500 Hz sloping to a mild to severe hearing loss in the left ear.  A complete audiologic evaluation with ENT consult, possible hearing aid, and consistent use of hearing protective devices around loud noises is recommended.    Your pulmonary function test shows normal spirometry     EKG shows a sinus rhythm with first-degree AV block and no significant changes when compared to previous tracings.    In summary, you are overall in good health. You are Obese and based on this year's testing also Diabetic. Lifestyle modifications to promote weight loss, like regular physical activity and decreased caloric intake, are encouraged.  You also need to see your family physician to discuss treatment for your diabetes. Your 10-year risk of heart attack or stroke is low.  However, if repeat testing by your family physician confirms diabetes, treatment with daily aspirin and cholesterol medications is recommended.     Based on the [place] Preventive Task Force recommendations, you should receive yearly Influenza vaccinations. You should have a Tetanus booster every 10 years. You should complete the primary COVID vaccine series. You should start screening for Colon cancer at 44 years old.    It was a pleasure to see you again and perform this Executive Health evaluation. Please follow-up with Dr. Lea to discuss your new  diagnosis of diabetes. If I can be of any further assistance, or if you have any additional questions or concerns, please do not hesitate to let me know.    Sincerely,    Ayo Diez MD, FAAFP

## 2023-12-18 NOTE — PROGRESS NOTES
"Nutrition Assessment  Session Time:  30 minutes      Client name:  Rip Lopez  :  1980  Age:  43 y.o.  Gender:  male    Client states:  [employer] employee.  Present for annual physical.  Last physical and nutrition consult: 2022    Repots needing to lose 20 lbs.  Plans to start working towards weight loss again in January.    Started year  at 210-215 lbs.  Reports being an emotional eater and tends to overeat during those time.     Reports main challenge area is portion control which he plans to start working on first.     Alcohol was increased at some point during the year, but has since improved his intake and plans to decrease even further.       Anthropometrics  Height:  70"     Weight:  222 lbs  BMI:  31.85  % Body Fat:  n/a    Clinical Signs/Symptoms  N/V/D:  none noted  Appetite:  good       Past Medical History:   Diagnosis Date    ADHD (attention deficit hyperactivity disorder)     Anxiety     History of melanoma     Lumbar pain with radiation down left leg 2020       Past Surgical History:   Procedure Laterality Date    APPENDECTOMY      SKIN CANCER EXCISION      VASECTOMY         Medications    has a current medication list which includes the following prescription(s): dextroamphetamine-amphetamine, ibuprofen, and sertraline.    Vitamins, Minerals, and/or Supplements:  not discussed     Food/Medication Interactions:  Reviewed     Food Allergies or Intolerances:  NKFA noted in chart     Social History    Marital status:    Employment:  [employer]    Social History     Tobacco Use    Smoking status: Never    Smokeless tobacco: Never   Substance Use Topics    Alcohol use: No        Lab Reports   Sodium   Date Value Ref Range Status   2022 137 136 - 145 mmol/L Final     Potassium   Date Value Ref Range Status   2022 4.7 3.5 - 5.1 mmol/L Final     Chloride   Date Value Ref Range Status   2022 105 95 - 110 mmol/L Final     CO2   Date " Value Ref Range Status   12/05/2022 23 23 - 29 mmol/L Final     Glucose   Date Value Ref Range Status   12/05/2022 101 70 - 110 mg/dL Final     BUN   Date Value Ref Range Status   12/05/2022 18 6 - 20 mg/dL Final     Creatinine   Date Value Ref Range Status   12/05/2022 1.0 0.5 - 1.4 mg/dL Final     Calcium   Date Value Ref Range Status   12/05/2022 9.7 8.7 - 10.5 mg/dL Final     Total Protein   Date Value Ref Range Status   12/05/2022 7.3 6.0 - 8.4 g/dL Final     Albumin   Date Value Ref Range Status   12/05/2022 4.4 3.5 - 5.2 g/dL Final     Total Bilirubin   Date Value Ref Range Status   12/05/2022 0.8 0.1 - 1.0 mg/dL Final     Comment:     For infants and newborns, interpretation of results should be based  on gestational age, weight and in agreement with clinical  observations.    Premature Infant recommended reference ranges:  Up to 24 hours.............<8.0 mg/dL  Up to 48 hours............<12.0 mg/dL  3-5 days..................<15.0 mg/dL  6-29 days.................<15.0 mg/dL       Alkaline Phosphatase   Date Value Ref Range Status   12/05/2022 40 (L) 55 - 135 U/L Final     AST   Date Value Ref Range Status   12/05/2022 22 10 - 40 U/L Final     ALT   Date Value Ref Range Status   12/05/2022 30 10 - 44 U/L Final     Anion Gap   Date Value Ref Range Status   12/05/2022 9 8 - 16 mmol/L Final     eGFR if    Date Value Ref Range Status   12/29/2021 >60 >60 mL/min/1.73 m^2 Final     eGFR if non    Date Value Ref Range Status   12/29/2021 >60 >60 mL/min/1.73 m^2 Final     Comment:     Calculation used to obtain the estimated glomerular filtration  rate (eGFR) is the CKD-EPI equation.         Lab Results   Component Value Date    WBC 5.34 12/05/2022    HGB 14.4 12/05/2022    HCT 40.0 12/05/2022    MCV 86 12/05/2022     12/05/2022        Lab Results   Component Value Date    CHOL 151 12/05/2022     Lab Results   Component Value Date    HDL 42 12/05/2022     Lab Results    Component Value Date    LDLCALC 82.8 12/05/2022     Lab Results   Component Value Date    TRIG 131 12/05/2022     Lab Results   Component Value Date    CHOLHDL 27.8 12/05/2022     Lab Results   Component Value Date    HGBA1C 4.8 12/05/2022     BP Readings from Last 1 Encounters:   01/12/23 125/85       Food History  Recent lunch from [employer]: burger + fries + milkshake    Exercise History:  not discussed    Cultural/Spiritual/Personal Preferences:  None identified    Support System:  family/friends    State of Change:  Contemplation    Barriers to Change:  none    Diagnosis    obesity related to excess energy intake as evidenced by BMI 31.    Intervention    RMR (Method:  [place] Jeor):  1908 kcal  Activity Factor:  1.3    BRIELLE:  2480 kcal    Goals:  1.  Consider meeting with professional (such as a therapist) to work through strategies to prevent emotional eating.  2.  Include non-starchy vegetable source with each meal.       Nutrition Education  The following education was provided to the patient:  Discussed weight management.  Suggested dietary modifications based on current dietary behaviors and individual food preferences.  *Lab results were pending at time of consult and so, not discussed with patient.    Patient verbalized understanding of nutrition education and recommendations received.    Handouts Provided  none    Monitoring/Evaluation    Monitor the following:  Weight  BMI  Caloric intake  Labs:  lipid panel, glucose, HgbA1c    Follow Up Plan:  Communication with referring healthcare provider is unnecessary at this time as patient presented as part of annual wellness exam.  However, will follow up with patient in 1-2 years.

## 2023-12-18 NOTE — TELEPHONE ENCOUNTER
Attempted to contact patient to complete nutrition visit via telephone. No answer. Left voicemail.    Call back number: [phone]

## 2023-12-19 LAB
BRPFT: ABNORMAL
FEF 25 75 LLN: 2.21
FEF 25 75 PRE REF: 83.2 %
FEF 25 75 REF: 3.9
FEV1 FVC LLN: 69
FEV1 FVC PRE REF: 98.7 %
FEV1 FVC REF: 80
FEV1 LLN: 3.25
FEV1 PRE REF: 83.7 %
FEV1 REF: 4.11
FVC LLN: 4.08
FVC PRE REF: 84.4 %
FVC REF: 5.17
PEF LLN: 7.76
PEF PRE REF: 75.2 %
PEF REF: 10.08
PRE FEF 25 75: 3.24 L/S (ref 2.21–5.59)
PRE FET 100: 10.23 SEC
PRE FEV1 FVC: 78.96 % (ref 69.45–90.53)
PRE FEV1: 3.44 L (ref 3.25–4.98)
PRE FVC: 4.36 L (ref 4.08–6.25)
PRE PEF: 7.58 L/S (ref 7.76–12.4)
TESTOST SERPL-MCNC: 611 NG/DL (ref 304–1227)

## 2023-12-20 LAB
CITY: NORMAL
COUNTY: NORMAL
GUARDIAN FIRST NAME: NORMAL
GUARDIAN LAST NAME: NORMAL
LEAD BLD-MCNC: <1 MCG/DL
PHONE #: NORMAL
POSTAL CODE: NORMAL
RACE: NORMAL
STATE OF RESIDENCE: NORMAL
STREET ADDRESS: NORMAL

## 2024-12-12 DIAGNOSIS — Z00.00 ROUTINE GENERAL MEDICAL EXAMINATION AT A HEALTH CARE FACILITY: Primary | ICD-10-CM

## 2024-12-24 ENCOUNTER — HOSPITAL ENCOUNTER (OUTPATIENT)
Dept: RADIOLOGY | Facility: HOSPITAL | Age: 44
Discharge: HOME | End: 2024-12-24
Attending: FAMILY MEDICINE
Payer: COMMERCIAL

## 2024-12-24 ENCOUNTER — CLINICAL SUPPORT (OUTPATIENT)
Dept: INTERNAL MEDICINE | Facility: CLINIC | Age: 44
End: 2024-12-24
Payer: COMMERCIAL

## 2024-12-24 ENCOUNTER — CLINICAL SUPPORT (OUTPATIENT)
Dept: INTERNAL MEDICINE | Facility: CLINIC | Age: 44
End: 2024-12-24

## 2024-12-24 ENCOUNTER — OFFICE VISIT (OUTPATIENT)
Dept: INTERNAL MEDICINE | Facility: CLINIC | Age: 44
End: 2024-12-24
Payer: COMMERCIAL

## 2024-12-24 ENCOUNTER — CLINICAL SUPPORT (OUTPATIENT)
Dept: PULMONOLOGY | Facility: CLINIC | Age: 44
End: 2024-12-24
Payer: COMMERCIAL

## 2024-12-24 ENCOUNTER — HOSPITAL ENCOUNTER (OUTPATIENT)
Dept: CARDIOLOGY | Facility: HOSPITAL | Age: 44
Discharge: HOME | End: 2024-12-24
Attending: FAMILY MEDICINE
Payer: COMMERCIAL

## 2024-12-24 VITALS
SYSTOLIC BLOOD PRESSURE: 123 MMHG | TEMPERATURE: 97 F | HEIGHT: 70 IN | WEIGHT: 218 LBS | HEART RATE: 61 BPM | BODY MASS INDEX: 31.21 KG/M2 | DIASTOLIC BLOOD PRESSURE: 71 MMHG

## 2024-12-24 DIAGNOSIS — Z23 NEED FOR INFLUENZA VACCINATION: ICD-10-CM

## 2024-12-24 DIAGNOSIS — Z00.00 ROUTINE GENERAL MEDICAL EXAMINATION AT A HEALTH CARE FACILITY: ICD-10-CM

## 2024-12-24 DIAGNOSIS — Z12.11 SCREEN FOR COLON CANCER: ICD-10-CM

## 2024-12-24 DIAGNOSIS — Z12.9 CANCER SCREENING: Primary | ICD-10-CM

## 2024-12-24 DIAGNOSIS — Z00.00 ROUTINE GENERAL MEDICAL EXAMINATION AT A HEALTH CARE FACILITY: Primary | ICD-10-CM

## 2024-12-24 DIAGNOSIS — R73.09 ELEVATED HEMOGLOBIN A1C: ICD-10-CM

## 2024-12-24 LAB
ALBUMIN SERPL BCP-MCNC: 4.3 G/DL (ref 3.5–5.2)
ALP SERPL-CCNC: 38 U/L (ref 40–150)
ALT SERPL W/O P-5'-P-CCNC: 38 U/L (ref 10–44)
ANION GAP SERPL CALC-SCNC: 10 MMOL/L (ref 8–16)
AST SERPL-CCNC: 21 U/L (ref 10–40)
BILIRUB SERPL-MCNC: 0.8 MG/DL (ref 0.1–1)
BILIRUB UR QL STRIP: NEGATIVE
BRPFT: NORMAL
BUN SERPL-MCNC: 17 MG/DL (ref 6–20)
CALCIUM SERPL-MCNC: 9.3 MG/DL (ref 8.7–10.5)
CHLORIDE SERPL-SCNC: 107 MMOL/L (ref 95–110)
CHOLEST SERPL-MCNC: 160 MG/DL (ref 120–199)
CHOLEST/HDLC SERPL: 3.6 {RATIO} (ref 2–5)
CLARITY UR: CLEAR
CO2 SERPL-SCNC: 24 MMOL/L (ref 23–29)
COLOR UR: YELLOW
COMPLEXED PSA SERPL-MCNC: 1.1 NG/ML (ref 0–4)
CREAT SERPL-MCNC: 1 MG/DL (ref 0.5–1.4)
ERYTHROCYTE [DISTWIDTH] IN BLOOD BY AUTOMATED COUNT: 12.4 % (ref 11.5–14.5)
EST. GFR  (NO RACE VARIABLE): >60 ML/MIN/1.73 M^2
ESTIMATED AVG GLUCOSE: 91 MG/DL (ref 68–131)
FEF 25 75 LLN: 2.5
FEF 25 75 PRE REF: 80.4 %
FEF 25 75 REF: 4.21
FEV1 FVC LLN: 69
FEV1 FVC PRE REF: 99.4 %
FEV1 FVC REF: 80
FEV1 LLN: 3.21
FEV1 PRE REF: 89.3 %
FEV1 REF: 4.07
FVC LLN: 4.04
FVC PRE REF: 89.4 %
FVC REF: 5.12
GLUCOSE SERPL-MCNC: 99 MG/DL (ref 70–110)
GLUCOSE UR QL STRIP: NEGATIVE
HBA1C MFR BLD: 4.8 % (ref 4–5.6)
HCT VFR BLD AUTO: 44 % (ref 40–54)
HDLC SERPL-MCNC: 44 MG/DL (ref 40–75)
HDLC SERPL: 27.5 % (ref 20–50)
HGB BLD-MCNC: 15.7 G/DL (ref 14–18)
HGB UR QL STRIP: NEGATIVE
KETONES UR QL STRIP: NEGATIVE
LDLC SERPL CALC-MCNC: 98 MG/DL (ref 63–159)
LEUKOCYTE ESTERASE UR QL STRIP: NEGATIVE
MCH RBC QN AUTO: 31.2 PG (ref 27–31)
MCHC RBC AUTO-ENTMCNC: 35.7 G/DL (ref 32–36)
MCV RBC AUTO: 88 FL (ref 82–98)
NITRITE UR QL STRIP: NEGATIVE
NONHDLC SERPL-MCNC: 116 MG/DL
OHS QRS DURATION: 86 MS
OHS QTC CALCULATION: 396 MS
PEF LLN: 7.72
PEF PRE REF: 80.8 %
PEF REF: 10.03
PH UR STRIP: 6 [PH] (ref 5–8)
PLATELET # BLD AUTO: 267 K/UL (ref 150–450)
PMV BLD AUTO: 9.4 FL (ref 9.2–12.9)
POTASSIUM SERPL-SCNC: 4.2 MMOL/L (ref 3.5–5.1)
PRE FEF 25 75: 3.39 L/S (ref 2.5–5.93)
PRE FET 100: 10.16 SEC
PRE FEV1 FVC: 79.36 % (ref 69.25–88.85)
PRE FEV1: 3.64 L (ref 3.21–4.9)
PRE FVC: 4.58 L (ref 4.04–6.22)
PRE PEF: 8.11 L/S (ref 7.72–12.35)
PROT SERPL-MCNC: 7.5 G/DL (ref 6–8.4)
PROT UR QL STRIP: NEGATIVE
RBC # BLD AUTO: 5.03 M/UL (ref 4.6–6.2)
SODIUM SERPL-SCNC: 141 MMOL/L (ref 136–145)
SP GR UR STRIP: >=1.03 (ref 1–1.03)
TRIGL SERPL-MCNC: 90 MG/DL (ref 30–150)
TSH SERPL DL<=0.005 MIU/L-ACNC: 1.63 UIU/ML (ref 0.4–4)
URN SPEC COLLECT METH UR: ABNORMAL
WBC # BLD AUTO: 3.96 K/UL (ref 3.9–12.7)

## 2024-12-24 PROCEDURE — 81479 UNLISTED MOLECULAR PATHOLOGY: CPT | Mod: S$GLB,,, | Performed by: INTERNAL MEDICINE

## 2024-12-24 PROCEDURE — 71046 X-RAY EXAM CHEST 2 VIEWS: CPT | Mod: 26,,, | Performed by: RADIOLOGY

## 2024-12-24 PROCEDURE — 97802 MEDICAL NUTRITION INDIV IN: CPT | Mod: S$GLB,,, | Performed by: DIETITIAN, REGISTERED

## 2024-12-24 PROCEDURE — 3074F SYST BP LT 130 MM HG: CPT | Mod: CPTII,S$GLB,, | Performed by: FAMILY MEDICINE

## 2024-12-24 PROCEDURE — 81479 UNLISTED MOLECULAR PATHOLOGY: CPT | Mod: WS | Performed by: FAMILY MEDICINE

## 2024-12-24 PROCEDURE — 94010 BREATHING CAPACITY TEST: CPT | Mod: S$GLB,,, | Performed by: INTERNAL MEDICINE

## 2024-12-24 PROCEDURE — 80061 LIPID PANEL: CPT | Performed by: FAMILY MEDICINE

## 2024-12-24 PROCEDURE — 99999 PR PBB SHADOW E&M-EST. PATIENT-LVL III: CPT | Mod: PBBFAC,,, | Performed by: FAMILY MEDICINE

## 2024-12-24 PROCEDURE — 84153 ASSAY OF PSA TOTAL: CPT | Performed by: FAMILY MEDICINE

## 2024-12-24 PROCEDURE — 1159F MED LIST DOCD IN RCRD: CPT | Mod: CPTII,S$GLB,, | Performed by: FAMILY MEDICINE

## 2024-12-24 PROCEDURE — 84443 ASSAY THYROID STIM HORMONE: CPT | Performed by: FAMILY MEDICINE

## 2024-12-24 PROCEDURE — 80053 COMPREHEN METABOLIC PANEL: CPT | Performed by: FAMILY MEDICINE

## 2024-12-24 PROCEDURE — 99999 PR PBB SHADOW E&M-EST. PATIENT-LVL I: CPT | Mod: PBBFAC,,,

## 2024-12-24 PROCEDURE — 81003 URINALYSIS AUTO W/O SCOPE: CPT | Performed by: FAMILY MEDICINE

## 2024-12-24 PROCEDURE — 93010 ELECTROCARDIOGRAM REPORT: CPT | Mod: ,,, | Performed by: INTERNAL MEDICINE

## 2024-12-24 PROCEDURE — 3008F BODY MASS INDEX DOCD: CPT | Mod: CPTII,S$GLB,, | Performed by: FAMILY MEDICINE

## 2024-12-24 PROCEDURE — 85027 COMPLETE CBC AUTOMATED: CPT | Performed by: FAMILY MEDICINE

## 2024-12-24 PROCEDURE — 90471 IMMUNIZATION ADMIN: CPT | Mod: S$GLB,,, | Performed by: FAMILY MEDICINE

## 2024-12-24 PROCEDURE — 93005 ELECTROCARDIOGRAM TRACING: CPT

## 2024-12-24 PROCEDURE — 83655 ASSAY OF LEAD: CPT | Performed by: FAMILY MEDICINE

## 2024-12-24 PROCEDURE — 99396 PREV VISIT EST AGE 40-64: CPT | Mod: S$GLB,,, | Performed by: FAMILY MEDICINE

## 2024-12-24 PROCEDURE — 71046 X-RAY EXAM CHEST 2 VIEWS: CPT | Mod: TC

## 2024-12-24 PROCEDURE — 3044F HG A1C LEVEL LT 7.0%: CPT | Mod: CPTII,S$GLB,, | Performed by: FAMILY MEDICINE

## 2024-12-24 PROCEDURE — 83036 HEMOGLOBIN GLYCOSYLATED A1C: CPT | Performed by: FAMILY MEDICINE

## 2024-12-24 PROCEDURE — 3078F DIAST BP <80 MM HG: CPT | Mod: CPTII,S$GLB,, | Performed by: FAMILY MEDICINE

## 2024-12-24 PROCEDURE — 99211 OFF/OP EST MAY X REQ PHY/QHP: CPT | Mod: S$GLB,,, | Performed by: INTERNAL MEDICINE

## 2024-12-24 PROCEDURE — 90656 IIV3 VACC NO PRSV 0.5 ML IM: CPT | Mod: S$GLB,,, | Performed by: FAMILY MEDICINE

## 2024-12-24 NOTE — PROGRESS NOTES
"Nutrition Assessment  Session Time:  30 minutes      Client name:  Rip Lopez  :  1980  Age:  44 y.o.  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  Present for nutrition counseling as part of his annual Executive Health physical.  Last seen: 2023    Began following carnivore diet starting 1 month ago.   Reports trying to lose weight, improve inflammation, and improve dental health.  3 times per week will have salad. Spring mix + fresh vegetable.     Drinks: water, coffee + splash of creamer  Alcohol: Whiskey, bourbon    Exercise: started recently, inconsistent due to being lazy    When elevated A1c (2023) was brought to patient he states it was probably brought to his attention but patient never followed up with his PCP.  Interested in finding out what can be done to improve this lab.       Anthropometrics  Height:  70"     Weight:  218 lbs  BMI:  31.3  % Body Fat:  n/a    Clinical Signs/Symptoms  N/V/D:  none noted  Appetite:  good       Past Medical History:   Diagnosis Date    ADHD (attention deficit hyperactivity disorder)     Anxiety     History of melanoma     Lumbar pain with radiation down left leg 2020       Past Surgical History:   Procedure Laterality Date    APPENDECTOMY      SKIN CANCER EXCISION      VASECTOMY         Medications    has a current medication list which includes the following prescription(s): dextroamphetamine-amphetamine, ibuprofen, and sertraline.    Vitamins, Minerals, and/or Supplements:  not discussed     Food/Medication Interactions:  Reviewed     Food Allergies or Intolerances:  NKFA noted in chart     Social History    Marital status:    Employment:  yes    Social History     Tobacco Use    Smoking status: Never    Smokeless tobacco: Never   Substance Use Topics    Alcohol use: No        Lab Reports   Sodium   Date Value Ref Range Status   2023 141 136 - 145 mmol/L Final     Potassium   Date " Value Ref Range Status   12/18/2023 4.4 3.5 - 5.1 mmol/L Final     Chloride   Date Value Ref Range Status   12/18/2023 107 95 - 110 mmol/L Final     CO2   Date Value Ref Range Status   12/18/2023 25 23 - 29 mmol/L Final     Glucose   Date Value Ref Range Status   12/18/2023 99 70 - 110 mg/dL Final     BUN   Date Value Ref Range Status   12/18/2023 17 6 - 20 mg/dL Final     Creatinine   Date Value Ref Range Status   12/18/2023 1.0 0.5 - 1.4 mg/dL Final     Calcium   Date Value Ref Range Status   12/18/2023 9.2 8.7 - 10.5 mg/dL Final     Total Protein   Date Value Ref Range Status   12/18/2023 7.4 6.0 - 8.4 g/dL Final     Albumin   Date Value Ref Range Status   12/18/2023 4.3 3.5 - 5.2 g/dL Final     Total Bilirubin   Date Value Ref Range Status   12/18/2023 0.4 0.1 - 1.0 mg/dL Final     Comment:     For infants and newborns, interpretation of results should be based  on gestational age, weight and in agreement with clinical  observations.    Premature Infant recommended reference ranges:  Up to 24 hours.............<8.0 mg/dL  Up to 48 hours............<12.0 mg/dL  3-5 days..................<15.0 mg/dL  6-29 days.................<15.0 mg/dL       Alkaline Phosphatase   Date Value Ref Range Status   12/18/2023 37 (L) 55 - 135 U/L Final     AST   Date Value Ref Range Status   12/18/2023 27 10 - 40 U/L Final     ALT   Date Value Ref Range Status   12/18/2023 44 10 - 44 U/L Final     Anion Gap   Date Value Ref Range Status   12/18/2023 9 8 - 16 mmol/L Final     eGFR if    Date Value Ref Range Status   12/29/2021 >60 >60 mL/min/1.73 m^2 Final     eGFR if non    Date Value Ref Range Status   12/29/2021 >60 >60 mL/min/1.73 m^2 Final     Comment:     Calculation used to obtain the estimated glomerular filtration  rate (eGFR) is the CKD-EPI equation.         Lab Results   Component Value Date    WBC 4.57 12/18/2023    HGB 14.5 12/18/2023    HCT 40.4 12/18/2023    MCV 86 12/18/2023      12/18/2023        Lab Results   Component Value Date    CHOL 156 12/18/2023     Lab Results   Component Value Date    HDL 41 12/18/2023     Lab Results   Component Value Date    LDLCALC 89.8 12/18/2023     Lab Results   Component Value Date    TRIG 126 12/18/2023     Lab Results   Component Value Date    CHOLHDL 26.3 12/18/2023     Lab Results   Component Value Date    HGBA1C 6.8 (H) 12/18/2023     BP Readings from Last 1 Encounters:   12/18/23 118/71       Food History  reviewed    Exercise History:  reviewed    Cultural/Spiritual/Personal Preferences:  None identified    Support System:  family/friends    State of Change:  Contemplation    Barriers to Change:  none    Diagnosis    Altered nutrition related laboratory values related to untreated diabetes as evidenced by HgbA1c 6.8 (12-).    Intervention    RMR (Method:  [place] St. Jeor):  1885 kcal  Activity Factor:  1.2    BRIELLE:  2262 kcal    Goals:  1.  188 grams of carbohydrates daily, evenly distributed throughout the day  2.  Consistency with weekly exercise      Nutrition Education  The following education was provided to the patient:  Discussed Heart Healthy Eating.  Suggested dietary modifications based on current dietary behaviors and individual food preferences.  Discussed recommended fiber intake and food sources of such.  Discussed diabetes nutrition therapy  Reviewed dietary related lab values from December 2023 and discussed diet/lifestyle changes to consider  Provided contact information for MRT food sensitivity testing  Encouraged patient to discuss elevated HgbA1c with PCP    Patient verbalized understanding of nutrition education and recommendations received.    Handouts Provided  none    Monitoring/Evaluation    Monitor the following:  Weight  BMI  Caloric intake  Labs:  lipid panel, HgbA1c, glucose    Follow Up Plan:  Communication with referring healthcare provider is unnecessary at this time as patient presented as part of annual  wellness exam.  However, will follow up with patient in 1-2 years.

## 2024-12-24 NOTE — PROGRESS NOTES
"Subjective:   Patient ID: Rip Lopez is a 44 y.o. male.  Chief Complaint:  Executive Health    Presents for executive Health evaluation   PCP Dr Jhon Lea  Medical history for melanoma, MDD, ADD lumbar degenerative disc disease, bilateral hearing loss, and Obesity.  Surgical history for skin cancer excision and appendectomy and vasectomy  Current medications Zoloft 50 mg daily and Motrin 800 mg 3 times a day as needed and Adderall 10 mg BID  No known drug allergies  Social history employed by the [employer].  .  Three children. No tobacco, alcohol, illicit drug use.  Family history includes father, alive, diabetes, hypertension, heart disease, and ascites.  Mother, alive, seizures.  No known colon or prostate cancer.    Health maintenance with tetanus booster November 2018 reported flu vaccine September 2022 a completed primary COVID vaccine series but no up-to-date booster, and no previous colon cancer screening.  and one Covid vaccine  Last year's visit A1c 6.8%.  Possible new onset diabetes.  Did not follow up with PCP to discuss treatment options.  Denies symptoms hypo hyperglycemia.  No specific complaints today.       Review of Systems   Constitutional:  Negative for chills, fatigue and fever.   Eyes:  Negative for visual disturbance.   Respiratory:  Negative for cough, chest tightness, shortness of breath and wheezing.    Cardiovascular:  Negative for chest pain, palpitations and leg swelling.   Gastrointestinal:  Negative for abdominal pain, constipation, diarrhea, nausea and vomiting.   Endocrine: Negative for polydipsia, polyphagia and polyuria.   Musculoskeletal:  Negative for myalgias.   Skin:  Negative for rash.   Neurological:  Negative for dizziness, syncope, weakness, numbness and headaches.       Objective:   /71   Pulse 61   Temp 96.9 °F (36.1 °C)   Ht 5' 10" (1.778 m)   Wt 98.9 kg (218 lb)   BMI 31.28 kg/m²     Physical Exam  Vitals and nursing note " reviewed.   Constitutional:       Appearance: Normal appearance. He is well-developed. He is obese.   HENT:      Right Ear: Tympanic membrane and ear canal normal.      Left Ear: Tympanic membrane and ear canal normal.      Nose: No congestion or rhinorrhea.      Mouth/Throat:      Pharynx: Oropharynx is clear. Uvula midline. No pharyngeal swelling, oropharyngeal exudate or posterior oropharyngeal erythema.   Neck:      Thyroid: No thyroid mass, thyromegaly or thyroid tenderness.   Cardiovascular:      Rate and Rhythm: Normal rate and regular rhythm.      Heart sounds: Normal heart sounds.   Pulmonary:      Effort: Pulmonary effort is normal.      Breath sounds: Normal breath sounds.   Abdominal:      General: There is no distension.      Palpations: Abdomen is soft.      Tenderness: There is no abdominal tenderness.   Skin:     Findings: No rash.   Psychiatric:         Mood and Affect: Mood and affect normal.     CBC and CMP normal   Total cholesterol 160.  Triglycerides 90.  HDL 44.  LDL 98.  Ten year cardiovascular risk 1.5%.    A1c and TSH pending   Urinalysis normal   PSA pending   Lead level pending    Chest x-ray normal     Pulmonary function tests normal     EKG pending    Assessment:       ICD-10-CM ICD-9-CM   1. Routine general medical examination at a health care facility  Z00.00 V70.0   2. Screen for colon cancer  Z12.11 V76.51   3. Need for influenza vaccination  Z23 V04.81   4. Elevated hemoglobin A1c  R73.09 790.29     Plan:   Routine general medical examination at a health care facility  Screen for colon cancer  Need for influenza vaccination  -     influenza (Flulaval, Fluzone, Fluarix) 45 mcg/0.5 mL IM vaccine (> or = 6 mo) 0.5 mL  -     Cancel: Cologuard Screening (Multitarget Stool DNA); Future; Expected date: 12/24/2024  -     Ambulatory referral/consult to Endo Procedure ; Future; Expected date: 12/25/2024  Blood pressure normal.  BMI 31.    Send full executive Health letter when all  test resulted.    Request colonoscopy for colon cancer screening   Prostate cancer screening today  Tetanus booster up-to-date   Declines COVID booster   Flu vaccine today    Elevated hemoglobin A1c  Reviewed last year's A1c which was 6.8% and elevated in diabetic range  Asymptomatic   Repeat A1c today   Discussed if remains in diabetic range, will need to follow up with PCP to start medication and have additional evaluation to include eye exam, foot exam, and microalbumin    Follow up with PCP as scheduled     Return to clinic 1 year for executive Health evaluation

## 2024-12-24 NOTE — LETTER
December 24, 2024    Rip Lopez  [address]  [place] [address]             The [address]  [address] [address] [facility]  [place] [address]  Phone: [phone]  Fax: [phone] Dear Mr. Lopez,    On December 14, 2024, it was a pleasure to see you again and perform your Executive Health evaluation. At the time of this visit, you are 44 years old.  Last year's testing revealed an A1c of 6.8% and possible new diagnosis of diabetes, but you did not follow up with through PCP to discuss any treatment options.  You denied any symptoms of diabetes today. You also did not have any specific complaints or concerns during todays visit.      YOUR PAST MEDICAL HISTORY includes melanoma, depression, ADD, lumbar degenerative disc disease, hearing loss, and obesity.    YOUR PAST SURGICAL HISTORY includes a vasectomy, a skin cancer excision, and an appendectomy.    YOUR CURRENT MEDICATIONS include Zoloft 50 mg daily, Adderall 10 mg twice a day, and Motrin 800 mg 3 times a day as needed.    You do not have ANY KNOWN DRUG ALLERGIES.    YOUR SOCIAL HISTORY includes employment by the [employer]. You are  with three children. You denied any tobacco, alcohol, or illicit drug use.    YOUR FAMILY HISTORY includes your father, alive, with diabetes, hypertension, heart disease, and ascites. Your mother, alive, with epilipsy. No known colon or prostate cancer.    YOUR ROUTINE HEALTH MAINTENANCE includes a tetanus booster in November 2018, reported flu vaccine in September 2022, and initial COVID vaccine series but no up-to-date booster, and no previous colon cancer screening.    PHYSICAL EXAMINATION: You are 5 ft 10 in tall, weighing 218 pounds with a body mass index of 31. You remain in the Obese category. Your blood pressure is 123/71. Your heart rate is 61 beats per minute. All of these are normal values. Your physical examination did not reveal any significant  abnormal findings.    YOUR BLOOD WORK AND ADDITIONAL TESTS: Reveal normal white cell counts, red cell counts, and platelet levels. You are not Anemic. Your glucose, kidney, liver, and electrolyte tests are normal. Your total cholesterol is 160. Your triglycerides are 90. Your HDL is 44. Your LDL is 98.  Based on these numbers, your 10-year risk of heart attack or stroke is 1.5%. Your hemoglobin A1c is 4.8%, which is is in a normal range and no longer elevated. Your TSH level is 1.631, which is in a normal range. You do not have an active Thyroid problem.  Your urinalysis is very concentrated with a specific gravity of 1.030, but otherwise is normal. Your serum PSA level is 1.1, which is in a normal range.  You do not have any suspicions for prostate cancer.  Your serum lead level is normal.    Your pulmonary function test shows normal spirometry.    Your chest x-ray is normal with no acute or chronic cardiopulmonary abnormality present.    Your EKG shows a Sinus bradycardia, but otherwise is normal.    In summary, you are overall in good health. You are Obese. Lifestyle modifications to promote weight loss, like regular physical activity and decreased caloric intake, are encouraged. Your A1c this year is in a normal range. Your 10-year risk of heart attack or stroke is low.  Treatment with daily aspirin and cholesterol medications is not recommended.     Based on the [place] Preventive Task Force recommendations, you should receive yearly Influenza vaccinations. You should receive a Tetanus booster every 10 years. You should complete the primary COVID vaccine series. You should are due for colon cancer screening this year. A colonoscopy referral was placed for you today.    It was a pleasure to see you again and perform this Executive Health evaluation. If I can be of any further assistance, or if you have any additional questions or concerns, please do not hesitate to let me know.    Sincerely,    Ayo MOORE  MD Rg, FAAFP

## 2025-01-02 ENCOUNTER — HOSPITAL ENCOUNTER (OUTPATIENT)
Dept: PREADMISSION TESTING | Facility: HOSPITAL | Age: 45
Discharge: HOME | End: 2025-01-02
Attending: FAMILY MEDICINE
Payer: COMMERCIAL

## 2025-01-02 DIAGNOSIS — Z00.00 ROUTINE GENERAL MEDICAL EXAMINATION AT A HEALTH CARE FACILITY: ICD-10-CM

## 2025-01-02 DIAGNOSIS — Z12.11 SCREEN FOR COLON CANCER: ICD-10-CM

## 2025-01-27 ENCOUNTER — HOSPITAL ENCOUNTER (OUTPATIENT)
Dept: PREADMISSION TESTING | Facility: HOSPITAL | Age: 45
Discharge: HOME | End: 2025-01-27
Attending: COLON & RECTAL SURGERY
Payer: COMMERCIAL

## 2025-02-13 NOTE — MR AVS SNAPSHOT
[facility] - [facility]  [address] [facility] [place] [address]  Phone: [phone]  Fax: [phone]                  Rip Lopez   2017 10:20 AM   Office Visit    Description:  Male : 1980   Provider:  Yannick Meadows MD   Department:  [facility] - Internal Medicine           Reason for Visit     Executive Health           Diagnoses this Visit        Comments    Routine general medical examination at a health care facility    -  Primary     History of melanoma                To Do List           Goals (5 Years of Data)     None      Follow-Up and Disposition     Return if symptoms worsen or fail to improve.      [facility]     [facility] Care [facility] -  Assistance  Registered nurses in the [facility] provide clinical advisement, health education, appointment booking, and other advisory services.  Call for this free service at [phone].             Medications           Message regarding Medications     Verify the changes and/or additions to your medication regime listed below are the same as discussed with your clinician today.  If any of these changes or additions are incorrect, please notify your healthcare provider.             Verify that the below list of medications is an accurate representation of the medications you are currently taking.  If none reported, the list may be blank. If incorrect, please contact your healthcare provider. Carry this list with you in case of emergency.                Clinical Reference Information           Vital Signs - Last Recorded  Most recent update: 2017  9:52 AM by Tatiana Fang MA    BP Pulse Temp Resp Ht Wt    110/62 (!) 56 96.7 °F (35.9 °C) (Tympanic) 16 6' (1.829 m) 90.7 kg (200 lb)    SpO2 BMI             98% 27.12 kg/m2         Blood Pressure          Most Recent Value    BP  110/62      Allergies as of 2017     No Known Allergies      Immunizations Administered on Date of Encounter - 2017   [FreeTextEntry1] : Migraine without aura  No changes today - will remain off Emgality Continue neck exercises RTO 6 months or sooner if needed.    Name Date Dose VIS Date Route    influenza - Quadrivalent 2/1/2017 0.5 mL 8/7/2015 Intramuscular      Orders Placed During Today's Visit      Normal Orders This Visit    Ambulatory referral to Dermatology     Influenza - Quadrivalent (3 years & older)       [facility] Sign-Up     Activating your [facility] account is as easy as 1-2-3!     1) Visit [email or web address], select Sign Up Now, enter this activation code and your date of birth, then select Next.  [email or web address]  Expires: 3/18/2017  8:23 AM      2) Create a username and password to use when you visit [facility] in the future and select a security question in case you lose your password and select Next.    3) Enter your e-mail address and click Sign Up!    Additional Information  If you have questions, please e-mail [email or web address] or call [phone] to talk to our [facility] staff. Remember, [facility] is NOT to be used for urgent needs. For medical emergencies, dial 911.